# Patient Record
Sex: MALE | Race: WHITE | Employment: UNEMPLOYED | URBAN - NONMETROPOLITAN AREA
[De-identification: names, ages, dates, MRNs, and addresses within clinical notes are randomized per-mention and may not be internally consistent; named-entity substitution may affect disease eponyms.]

---

## 2020-05-22 ENCOUNTER — APPOINTMENT (OUTPATIENT)
Dept: CT IMAGING | Age: 70
End: 2020-05-22
Payer: MEDICARE

## 2020-05-22 ENCOUNTER — APPOINTMENT (OUTPATIENT)
Dept: GENERAL RADIOLOGY | Age: 70
End: 2020-05-22
Payer: MEDICARE

## 2020-05-22 ENCOUNTER — HOSPITAL ENCOUNTER (EMERGENCY)
Age: 70
Discharge: HOME | End: 2020-05-22
Payer: MEDICARE

## 2020-05-22 VITALS
TEMPERATURE: 97.7 F | HEART RATE: 72 BPM | WEIGHT: 222 LBS | DIASTOLIC BLOOD PRESSURE: 82 MMHG | HEIGHT: 74 IN | SYSTOLIC BLOOD PRESSURE: 143 MMHG | BODY MASS INDEX: 28.49 KG/M2 | OXYGEN SATURATION: 92 % | RESPIRATION RATE: 22 BRPM

## 2020-05-22 LAB
A/G RATIO: 1.2 (ref 1.1–2.2)
ALBUMIN SERPL-MCNC: 5 G/DL (ref 3.4–5)
ALP BLD-CCNC: 104 U/L (ref 40–129)
ALT SERPL-CCNC: 51 U/L (ref 10–40)
ANION GAP SERPL CALCULATED.3IONS-SCNC: 13 MMOL/L (ref 3–16)
AST SERPL-CCNC: 53 U/L (ref 15–37)
BASOPHILS ABSOLUTE: 0 K/UL (ref 0–0.2)
BASOPHILS RELATIVE PERCENT: 0.3 %
BILIRUB SERPL-MCNC: 0.5 MG/DL (ref 0–1)
BILIRUBIN URINE: NEGATIVE
BLOOD, URINE: ABNORMAL
BUN BLDV-MCNC: 17 MG/DL (ref 7–20)
CALCIUM SERPL-MCNC: 10.4 MG/DL (ref 8.3–10.6)
CHLORIDE BLD-SCNC: 104 MMOL/L (ref 99–110)
CLARITY: CLEAR
CO2: 24 MMOL/L (ref 21–32)
COLOR: YELLOW
CREAT SERPL-MCNC: 1.3 MG/DL (ref 0.8–1.3)
EOSINOPHILS ABSOLUTE: 0.1 K/UL (ref 0–0.6)
EOSINOPHILS RELATIVE PERCENT: 0.9 %
EPITHELIAL CELLS, UA: NORMAL /HPF (ref 0–5)
GFR AFRICAN AMERICAN: >60
GFR NON-AFRICAN AMERICAN: 54
GLOBULIN: 4.1 G/DL
GLUCOSE BLD-MCNC: 146 MG/DL (ref 70–99)
GLUCOSE URINE: >=1000 MG/DL
HCT VFR BLD CALC: 46.7 % (ref 40.5–52.5)
HEMOGLOBIN: 15.3 G/DL (ref 13.5–17.5)
KETONES, URINE: NEGATIVE MG/DL
LEUKOCYTE ESTERASE, URINE: NEGATIVE
LYMPHOCYTES ABSOLUTE: 1 K/UL (ref 1–5.1)
LYMPHOCYTES RELATIVE PERCENT: 8.9 %
MCH RBC QN AUTO: 31.5 PG (ref 26–34)
MCHC RBC AUTO-ENTMCNC: 32.8 G/DL (ref 31–36)
MCV RBC AUTO: 96.1 FL (ref 80–100)
MICROSCOPIC EXAMINATION: YES
MONOCYTES ABSOLUTE: 0.7 K/UL (ref 0–1.3)
MONOCYTES RELATIVE PERCENT: 6.2 %
NEUTROPHILS ABSOLUTE: 9.4 K/UL (ref 1.7–7.7)
NEUTROPHILS RELATIVE PERCENT: 83.7 %
NITRITE, URINE: NEGATIVE
PDW BLD-RTO: 13.5 % (ref 12.4–15.4)
PH UA: 6 (ref 5–8)
PLATELET # BLD: 188 K/UL (ref 135–450)
PMV BLD AUTO: 8.3 FL (ref 5–10.5)
POTASSIUM REFLEX MAGNESIUM: 4.8 MMOL/L (ref 3.5–5.1)
PROTEIN UA: ABNORMAL MG/DL
RBC # BLD: 4.86 M/UL (ref 4.2–5.9)
RBC UA: NORMAL /HPF (ref 0–4)
SODIUM BLD-SCNC: 141 MMOL/L (ref 136–145)
SPECIFIC GRAVITY UA: 1.01 (ref 1–1.03)
TOTAL PROTEIN: 9.1 G/DL (ref 6.4–8.2)
URINE REFLEX TO CULTURE: ABNORMAL
URINE TYPE: ABNORMAL
UROBILINOGEN, URINE: 0.2 E.U./DL
WBC # BLD: 11.3 K/UL (ref 4–11)
WBC UA: NORMAL /HPF (ref 0–5)

## 2020-05-22 PROCEDURE — 85025 COMPLETE CBC W/AUTO DIFF WBC: CPT

## 2020-05-22 PROCEDURE — 81001 URINALYSIS AUTO W/SCOPE: CPT

## 2020-05-22 PROCEDURE — 71046 X-RAY EXAM CHEST 2 VIEWS: CPT

## 2020-05-22 PROCEDURE — 74177 CT ABD & PELVIS W/CONTRAST: CPT

## 2020-05-22 PROCEDURE — 6360000002 HC RX W HCPCS: Performed by: PHYSICIAN ASSISTANT

## 2020-05-22 PROCEDURE — 72125 CT NECK SPINE W/O DYE: CPT

## 2020-05-22 PROCEDURE — 96375 TX/PRO/DX INJ NEW DRUG ADDON: CPT

## 2020-05-22 PROCEDURE — 73030 X-RAY EXAM OF SHOULDER: CPT

## 2020-05-22 PROCEDURE — 72131 CT LUMBAR SPINE W/O DYE: CPT

## 2020-05-22 PROCEDURE — 36415 COLL VENOUS BLD VENIPUNCTURE: CPT

## 2020-05-22 PROCEDURE — 96374 THER/PROPH/DIAG INJ IV PUSH: CPT

## 2020-05-22 PROCEDURE — 3209999900 CT THORACIC SPINE TRAUMA RECONSTRUCTION

## 2020-05-22 PROCEDURE — 99284 EMERGENCY DEPT VISIT MOD MDM: CPT

## 2020-05-22 PROCEDURE — 80053 COMPREHEN METABOLIC PANEL: CPT

## 2020-05-22 PROCEDURE — 90715 TDAP VACCINE 7 YRS/> IM: CPT | Performed by: PHYSICIAN ASSISTANT

## 2020-05-22 PROCEDURE — 90471 IMMUNIZATION ADMIN: CPT | Performed by: PHYSICIAN ASSISTANT

## 2020-05-22 PROCEDURE — 6360000004 HC RX CONTRAST MEDICATION: Performed by: PHYSICIAN ASSISTANT

## 2020-05-22 PROCEDURE — 70450 CT HEAD/BRAIN W/O DYE: CPT

## 2020-05-22 RX ORDER — MORPHINE SULFATE 4 MG/ML
4 INJECTION, SOLUTION INTRAMUSCULAR; INTRAVENOUS ONCE
Status: COMPLETED | OUTPATIENT
Start: 2020-05-22 | End: 2020-05-22

## 2020-05-22 RX ORDER — ONDANSETRON 2 MG/ML
4 INJECTION INTRAMUSCULAR; INTRAVENOUS EVERY 6 HOURS PRN
Status: DISCONTINUED | OUTPATIENT
Start: 2020-05-22 | End: 2020-05-22 | Stop reason: HOSPADM

## 2020-05-22 RX ORDER — HYDROCODONE BITARTRATE AND ACETAMINOPHEN 5; 325 MG/1; MG/1
1 TABLET ORAL EVERY 6 HOURS PRN
Qty: 10 TABLET | Refills: 0 | Status: SHIPPED | OUTPATIENT
Start: 2020-05-22 | End: 2020-05-25

## 2020-05-22 RX ADMIN — ONDANSETRON HYDROCHLORIDE 4 MG: 2 INJECTION, SOLUTION INTRAMUSCULAR; INTRAVENOUS at 16:02

## 2020-05-22 RX ADMIN — IOPAMIDOL 75 ML: 755 INJECTION, SOLUTION INTRAVENOUS at 16:23

## 2020-05-22 RX ADMIN — MORPHINE SULFATE 4 MG: 4 INJECTION, SOLUTION INTRAMUSCULAR; INTRAVENOUS at 16:03

## 2020-05-22 RX ADMIN — TETANUS TOXOID, REDUCED DIPHTHERIA TOXOID AND ACELLULAR PERTUSSIS VACCINE, ADSORBED 0.5 ML: 5; 2.5; 8; 8; 2.5 SUSPENSION INTRAMUSCULAR at 16:02

## 2020-05-22 ASSESSMENT — PAIN SCALES - GENERAL
PAINLEVEL_OUTOF10: 0
PAINLEVEL_OUTOF10: 10
PAINLEVEL_OUTOF10: 0
PAINLEVEL_OUTOF10: 10

## 2020-05-22 ASSESSMENT — PAIN DESCRIPTION - ORIENTATION: ORIENTATION: RIGHT

## 2020-05-22 ASSESSMENT — ENCOUNTER SYMPTOMS
BACK PAIN: 1
RESPIRATORY NEGATIVE: 1
GASTROINTESTINAL NEGATIVE: 1
EYES NEGATIVE: 1

## 2020-05-22 ASSESSMENT — PAIN DESCRIPTION - PAIN TYPE: TYPE: ACUTE PAIN

## 2020-05-22 ASSESSMENT — PAIN DESCRIPTION - LOCATION: LOCATION: SHOULDER

## 2020-05-22 ASSESSMENT — PAIN DESCRIPTION - ONSET: ONSET: OTHER (COMMENT)

## 2020-05-22 ASSESSMENT — PAIN DESCRIPTION - DESCRIPTORS: DESCRIPTORS: SHARP

## 2020-05-22 ASSESSMENT — PAIN DESCRIPTION - FREQUENCY: FREQUENCY: INTERMITTENT

## 2020-05-22 NOTE — ED PROVIDER NOTES
sluggish. Left eye: Pupil is round, reactive and not sluggish. Neck:      Musculoskeletal: Full passive range of motion without pain, normal range of motion and neck supple. Normal range of motion. Muscular tenderness present. No neck rigidity, crepitus, injury, pain with movement or spinous process tenderness. Cardiovascular:      Rate and Rhythm: Normal rate and regular rhythm. Pulses:           Radial pulses are 2+ on the right side and 2+ on the left side. Femoral pulses are 2+ on the right side and 2+ on the left side. Dorsalis pedis pulses are 2+ on the right side and 2+ on the left side. Posterior tibial pulses are 2+ on the right side and 2+ on the left side. Heart sounds: Normal heart sounds. No murmur. No gallop. Pulmonary:      Effort: Pulmonary effort is normal. No respiratory distress. Breath sounds: Normal breath sounds. No decreased breath sounds, wheezing, rhonchi or rales. Chest:      Chest wall: Tenderness present. No deformity, swelling or crepitus. Comments: Tenderness noted along the anterior rib cage. No ecchymosis noted. Abdominal:      General: Abdomen is flat. Bowel sounds are normal.      Palpations: Abdomen is soft. Tenderness: There is no abdominal tenderness. There is no right CVA tenderness, left CVA tenderness, guarding or rebound. Musculoskeletal:         General: No deformity. Right shoulder: He exhibits decreased range of motion, bony tenderness and pain. Right elbow: Normal.He exhibits normal range of motion, no swelling, no effusion, no deformity and no laceration. Right wrist: Normal. He exhibits normal range of motion, no tenderness, no bony tenderness, no swelling, no effusion, no crepitus, no deformity and no laceration. Cervical back: He exhibits tenderness.  He exhibits normal range of motion, no bony tenderness, no swelling, no edema, no deformity, no laceration, no pain, no spasm and noted below, none     Procedures    CRITICAL CARE TIME   N/A    CONSULTS:  None      EMERGENCY DEPARTMENT COURSE and DIFFERENTIAL DIAGNOSIS/MDM:   Vitals:    Vitals:    05/22/20 1715 05/22/20 1726 05/22/20 1832 05/22/20 1850   BP: (!) 155/78 (!) 155/78 (!) 143/82    Pulse: 72 75 72    Resp: (!) 31 30 22 22   Temp:       TempSrc:       SpO2: (!) 88% 92%     Weight:       Height:           Patient was given the following medications:  Medications   Tetanus-Diphth-Acell Pertussis (BOOSTRIX) injection 0.5 mL (0.5 mLs Intramuscular Given 5/22/20 1602)   morphine sulfate (PF) injection 4 mg (4 mg Intravenous Given 5/22/20 1603)   iopamidol (ISOVUE-370) 76 % injection 75 mL (75 mLs Intravenous Given 5/22/20 1623)       Patient brought in today for evaluation of a fall from 6 feet off of a ladder. On exam patient is alert oriented afebrile breathing on room air satting at 92%. Nontoxic in no acute respiratory distress. Old labs and records reviewed. Patient ambulated into the ER without difficulty. CBC reveals a slight leukocytosis of 11.3. Hemoglobin of 15.3. No acute electrolyte abnormalities. CT head reveals no acute intracranial abnormality. No acute fracture of the cervical spine. CT lumbar spine reveals an acute comminuted T12 vertebral body burst fracture. Will obtain CT thoracic for further evaluation. There is approximately 10% loss of height centrally without significant retropulsion. X-ray of the right shoulder reveals findings concerning for a right scapular fracture at the junction of the scapular body and glenoid. Chest x-ray reveals borderline cardiomegaly with probably vascular congestion. Changes of pulmonary fibrosis in mid and lower lungs. Will obtain CT chest, abdomen and pelvis at this time for further evaluation. Patient was given morphine and Zofran here. Urine shows moderate amount of blood.     CT thoracic spine trauma reconstructions reveals a compression wedge type fracture

## 2020-05-22 NOTE — ED NOTES
Pt requested to ambulate to CT. PA informed and approved.       Marquis Mcdermott, GAVINO  05/22/20 9680

## 2022-11-30 ENCOUNTER — APPOINTMENT (OUTPATIENT)
Dept: GENERAL RADIOLOGY | Age: 72
DRG: 871 | End: 2022-11-30
Payer: OTHER GOVERNMENT

## 2022-11-30 ENCOUNTER — APPOINTMENT (OUTPATIENT)
Dept: CT IMAGING | Age: 72
DRG: 871 | End: 2022-11-30
Payer: OTHER GOVERNMENT

## 2022-11-30 ENCOUNTER — HOSPITAL ENCOUNTER (INPATIENT)
Age: 72
LOS: 6 days | Discharge: HOME | DRG: 871 | End: 2022-12-06
Attending: STUDENT IN AN ORGANIZED HEALTH CARE EDUCATION/TRAINING PROGRAM | Admitting: HOSPITALIST
Payer: OTHER GOVERNMENT

## 2022-11-30 DIAGNOSIS — Z87.09 HISTORY OF PULMONARY FIBROSIS: ICD-10-CM

## 2022-11-30 DIAGNOSIS — J84.9 ILD (INTERSTITIAL LUNG DISEASE) (HCC): ICD-10-CM

## 2022-11-30 DIAGNOSIS — U07.1 PNEUMONIA DUE TO COVID-19 VIRUS: Primary | ICD-10-CM

## 2022-11-30 DIAGNOSIS — J96.21 ACUTE ON CHRONIC RESPIRATORY FAILURE WITH HYPOXIA (HCC): ICD-10-CM

## 2022-11-30 DIAGNOSIS — J12.82 PNEUMONIA DUE TO COVID-19 VIRUS: Primary | ICD-10-CM

## 2022-11-30 LAB
A/G RATIO: 0.8 (ref 1.1–2.2)
ALBUMIN SERPL-MCNC: 3.9 G/DL (ref 3.4–5)
ALP BLD-CCNC: 116 U/L (ref 40–129)
ALT SERPL-CCNC: 11 U/L (ref 10–40)
ANION GAP SERPL CALCULATED.3IONS-SCNC: 12 MMOL/L (ref 3–16)
AST SERPL-CCNC: 40 U/L (ref 15–37)
BASE EXCESS VENOUS: -5.5 MMOL/L (ref -3–3)
BASOPHILS ABSOLUTE: 0.1 K/UL (ref 0–0.2)
BASOPHILS RELATIVE PERCENT: 0.7 %
BILIRUB SERPL-MCNC: 0.7 MG/DL (ref 0–1)
BUN BLDV-MCNC: 21 MG/DL (ref 7–20)
CALCIUM SERPL-MCNC: 9.8 MG/DL (ref 8.3–10.6)
CARBOXYHEMOGLOBIN: 2.8 % (ref 0–1.5)
CHLORIDE BLD-SCNC: 99 MMOL/L (ref 99–110)
CO2: 23 MMOL/L (ref 21–32)
CREAT SERPL-MCNC: 1 MG/DL (ref 0.8–1.3)
D DIMER: 3.19 UG/ML FEU (ref 0–0.6)
EKG ATRIAL RATE: 71 BPM
EKG DIAGNOSIS: NORMAL
EKG P AXIS: 31 DEGREES
EKG P-R INTERVAL: 146 MS
EKG Q-T INTERVAL: 416 MS
EKG QRS DURATION: 82 MS
EKG QTC CALCULATION (BAZETT): 452 MS
EKG R AXIS: -2 DEGREES
EKG T AXIS: 38 DEGREES
EKG VENTRICULAR RATE: 71 BPM
EOSINOPHILS ABSOLUTE: 0.2 K/UL (ref 0–0.6)
EOSINOPHILS RELATIVE PERCENT: 1.9 %
GFR SERPL CREATININE-BSD FRML MDRD: >60 ML/MIN/{1.73_M2}
GLUCOSE BLD-MCNC: 174 MG/DL (ref 70–99)
HCO3 VENOUS: 18.3 MMOL/L (ref 23–29)
HCT VFR BLD CALC: 45.1 % (ref 40.5–52.5)
HEMOGLOBIN: 15 G/DL (ref 13.5–17.5)
INFLUENZA A: NOT DETECTED
INFLUENZA B: NOT DETECTED
LACTIC ACID, SEPSIS: 1.8 MMOL/L (ref 0.4–1.9)
LYMPHOCYTES ABSOLUTE: 1.3 K/UL (ref 1–5.1)
LYMPHOCYTES RELATIVE PERCENT: 10.4 %
MCH RBC QN AUTO: 31.4 PG (ref 26–34)
MCHC RBC AUTO-ENTMCNC: 33.2 G/DL (ref 31–36)
MCV RBC AUTO: 94.4 FL (ref 80–100)
METHEMOGLOBIN VENOUS: 0.3 %
MONOCYTES ABSOLUTE: 1.1 K/UL (ref 0–1.3)
MONOCYTES RELATIVE PERCENT: 8.8 %
NEUTROPHILS ABSOLUTE: 10.1 K/UL (ref 1.7–7.7)
NEUTROPHILS RELATIVE PERCENT: 78.2 %
O2 CONTENT, VEN: 14 VOL %
O2 SAT, VEN: 82 %
O2 THERAPY: ABNORMAL
PCO2, VEN: 30.8 MMHG (ref 40–50)
PDW BLD-RTO: 13.9 % (ref 12.4–15.4)
PH VENOUS: 7.39 (ref 7.35–7.45)
PLATELET # BLD: 174 K/UL (ref 135–450)
PLATELET SLIDE REVIEW: ADEQUATE
PMV BLD AUTO: 8.4 FL (ref 5–10.5)
PO2, VEN: 45.7 MMHG (ref 25–40)
POTASSIUM REFLEX MAGNESIUM: 5.2 MMOL/L (ref 3.5–5.1)
PRO-BNP: 355 PG/ML (ref 0–124)
PROCALCITONIN: 0.06 NG/ML (ref 0–0.15)
RBC # BLD: 4.78 M/UL (ref 4.2–5.9)
SARS-COV-2 RNA, RT PCR: DETECTED
SLIDE REVIEW: ABNORMAL
SODIUM BLD-SCNC: 134 MMOL/L (ref 136–145)
TCO2 CALC VENOUS: 19 MMOL/L
TOTAL PROTEIN: 8.7 G/DL (ref 6.4–8.2)
TROPONIN: <0.01 NG/ML
WBC # BLD: 12.9 K/UL (ref 4–11)

## 2022-11-30 PROCEDURE — 6360000004 HC RX CONTRAST MEDICATION: Performed by: STUDENT IN AN ORGANIZED HEALTH CARE EDUCATION/TRAINING PROGRAM

## 2022-11-30 PROCEDURE — 93010 ELECTROCARDIOGRAM REPORT: CPT | Performed by: INTERNAL MEDICINE

## 2022-11-30 PROCEDURE — 71260 CT THORAX DX C+: CPT | Performed by: STUDENT IN AN ORGANIZED HEALTH CARE EDUCATION/TRAINING PROGRAM

## 2022-11-30 PROCEDURE — 2060000000 HC ICU INTERMEDIATE R&B

## 2022-11-30 PROCEDURE — 2700000000 HC OXYGEN THERAPY PER DAY

## 2022-11-30 PROCEDURE — 87040 BLOOD CULTURE FOR BACTERIA: CPT

## 2022-11-30 PROCEDURE — 94761 N-INVAS EAR/PLS OXIMETRY MLT: CPT

## 2022-11-30 PROCEDURE — 6360000002 HC RX W HCPCS: Performed by: STUDENT IN AN ORGANIZED HEALTH CARE EDUCATION/TRAINING PROGRAM

## 2022-11-30 PROCEDURE — 36415 COLL VENOUS BLD VENIPUNCTURE: CPT

## 2022-11-30 PROCEDURE — 85379 FIBRIN DEGRADATION QUANT: CPT

## 2022-11-30 PROCEDURE — 84145 PROCALCITONIN (PCT): CPT

## 2022-11-30 PROCEDURE — 82803 BLOOD GASES ANY COMBINATION: CPT

## 2022-11-30 PROCEDURE — 85025 COMPLETE CBC W/AUTO DIFF WBC: CPT

## 2022-11-30 PROCEDURE — 71045 X-RAY EXAM CHEST 1 VIEW: CPT

## 2022-11-30 PROCEDURE — 83880 ASSAY OF NATRIURETIC PEPTIDE: CPT

## 2022-11-30 PROCEDURE — 80053 COMPREHEN METABOLIC PANEL: CPT

## 2022-11-30 PROCEDURE — 93005 ELECTROCARDIOGRAM TRACING: CPT | Performed by: STUDENT IN AN ORGANIZED HEALTH CARE EDUCATION/TRAINING PROGRAM

## 2022-11-30 PROCEDURE — 87636 SARSCOV2 & INF A&B AMP PRB: CPT

## 2022-11-30 PROCEDURE — 83605 ASSAY OF LACTIC ACID: CPT

## 2022-11-30 PROCEDURE — 84484 ASSAY OF TROPONIN QUANT: CPT

## 2022-11-30 PROCEDURE — 96374 THER/PROPH/DIAG INJ IV PUSH: CPT

## 2022-11-30 PROCEDURE — 99285 EMERGENCY DEPT VISIT HI MDM: CPT

## 2022-11-30 RX ORDER — ATORVASTATIN CALCIUM 40 MG/1
80 TABLET, FILM COATED ORAL DAILY
Status: DISCONTINUED | OUTPATIENT
Start: 2022-12-01 | End: 2022-12-06 | Stop reason: HOSPADM

## 2022-11-30 RX ORDER — POLYETHYLENE GLYCOL 3350 17 G/17G
17 POWDER, FOR SOLUTION ORAL DAILY PRN
Status: DISCONTINUED | OUTPATIENT
Start: 2022-11-30 | End: 2022-12-06 | Stop reason: HOSPADM

## 2022-11-30 RX ORDER — INSULIN LISPRO 100 [IU]/ML
0-4 INJECTION, SOLUTION INTRAVENOUS; SUBCUTANEOUS NIGHTLY
Status: DISCONTINUED | OUTPATIENT
Start: 2022-12-01 | End: 2022-12-06 | Stop reason: HOSPADM

## 2022-11-30 RX ORDER — SODIUM CHLORIDE 0.9 % (FLUSH) 0.9 %
5-40 SYRINGE (ML) INJECTION EVERY 12 HOURS SCHEDULED
Status: DISCONTINUED | OUTPATIENT
Start: 2022-11-30 | End: 2022-12-06 | Stop reason: HOSPADM

## 2022-11-30 RX ORDER — ISOSORBIDE MONONITRATE 60 MG/1
60 TABLET, EXTENDED RELEASE ORAL DAILY
Status: DISCONTINUED | OUTPATIENT
Start: 2022-12-01 | End: 2022-12-06 | Stop reason: HOSPADM

## 2022-11-30 RX ORDER — AMLODIPINE BESYLATE 5 MG/1
5 TABLET ORAL DAILY
Status: DISCONTINUED | OUTPATIENT
Start: 2022-12-01 | End: 2022-12-01

## 2022-11-30 RX ORDER — INSULIN LISPRO 100 [IU]/ML
0-4 INJECTION, SOLUTION INTRAVENOUS; SUBCUTANEOUS
Status: DISCONTINUED | OUTPATIENT
Start: 2022-12-01 | End: 2022-12-06 | Stop reason: HOSPADM

## 2022-11-30 RX ORDER — ASPIRIN 81 MG/1
81 TABLET ORAL DAILY
Status: DISCONTINUED | OUTPATIENT
Start: 2022-12-01 | End: 2022-12-06 | Stop reason: HOSPADM

## 2022-11-30 RX ORDER — ENOXAPARIN SODIUM 100 MG/ML
30 INJECTION SUBCUTANEOUS 2 TIMES DAILY
Status: DISCONTINUED | OUTPATIENT
Start: 2022-12-01 | End: 2022-12-06 | Stop reason: HOSPADM

## 2022-11-30 RX ORDER — ONDANSETRON 2 MG/ML
4 INJECTION INTRAMUSCULAR; INTRAVENOUS EVERY 6 HOURS PRN
Status: DISCONTINUED | OUTPATIENT
Start: 2022-11-30 | End: 2022-12-06 | Stop reason: HOSPADM

## 2022-11-30 RX ORDER — DEXAMETHASONE SODIUM PHOSPHATE 10 MG/ML
6 INJECTION, SOLUTION INTRAMUSCULAR; INTRAVENOUS ONCE
Status: COMPLETED | OUTPATIENT
Start: 2022-11-30 | End: 2022-11-30

## 2022-11-30 RX ORDER — ACETAMINOPHEN 325 MG/1
650 TABLET ORAL EVERY 6 HOURS PRN
Status: DISCONTINUED | OUTPATIENT
Start: 2022-11-30 | End: 2022-12-06 | Stop reason: HOSPADM

## 2022-11-30 RX ORDER — SODIUM CHLORIDE 9 MG/ML
INJECTION, SOLUTION INTRAVENOUS PRN
Status: DISCONTINUED | OUTPATIENT
Start: 2022-11-30 | End: 2022-12-06 | Stop reason: HOSPADM

## 2022-11-30 RX ORDER — ONDANSETRON 4 MG/1
4 TABLET, ORALLY DISINTEGRATING ORAL EVERY 8 HOURS PRN
Status: DISCONTINUED | OUTPATIENT
Start: 2022-11-30 | End: 2022-12-06 | Stop reason: HOSPADM

## 2022-11-30 RX ORDER — DEXTROSE MONOHYDRATE 100 MG/ML
INJECTION, SOLUTION INTRAVENOUS CONTINUOUS PRN
Status: DISCONTINUED | OUTPATIENT
Start: 2022-11-30 | End: 2022-12-06 | Stop reason: HOSPADM

## 2022-11-30 RX ORDER — SODIUM CHLORIDE 0.9 % (FLUSH) 0.9 %
5-40 SYRINGE (ML) INJECTION PRN
Status: DISCONTINUED | OUTPATIENT
Start: 2022-11-30 | End: 2022-12-06 | Stop reason: HOSPADM

## 2022-11-30 RX ORDER — LISINOPRIL 10 MG/1
10 TABLET ORAL DAILY
Status: DISCONTINUED | OUTPATIENT
Start: 2022-12-01 | End: 2022-12-01

## 2022-11-30 RX ORDER — ATENOLOL 50 MG/1
100 TABLET ORAL DAILY
Status: DISCONTINUED | OUTPATIENT
Start: 2022-12-01 | End: 2022-12-06 | Stop reason: HOSPADM

## 2022-11-30 RX ORDER — DEXAMETHASONE SODIUM PHOSPHATE 10 MG/ML
6 INJECTION, SOLUTION INTRAMUSCULAR; INTRAVENOUS EVERY 24 HOURS
Status: DISCONTINUED | OUTPATIENT
Start: 2022-12-01 | End: 2022-12-01

## 2022-11-30 RX ADMIN — IOPAMIDOL 85 ML: 755 INJECTION, SOLUTION INTRAVENOUS at 17:04

## 2022-11-30 RX ADMIN — DEXAMETHASONE SODIUM PHOSPHATE 6 MG: 10 INJECTION INTRAMUSCULAR; INTRAVENOUS at 18:39

## 2022-11-30 ASSESSMENT — PAIN - FUNCTIONAL ASSESSMENT: PAIN_FUNCTIONAL_ASSESSMENT: NONE - DENIES PAIN

## 2022-11-30 NOTE — ED PROVIDER NOTES
[address] [place] [address] ED      CHIEF COMPLAINT  Respiratory Distress (Patient comes in due to difficulty breathing. Patient 79% in triage on 4LNC)     HISTORY OF PRESENT ILLNESS  Monserrat Freeman is a 67 y.o. male  who presents to the ED complaining of shortness of breath, cough and hemoptysis. Patient states that symptoms started about 2 days ago. He states that his main concern is his shortness of breath. He is normally on 2 L of oxygen via nasal cannula for history of pulmonary fibrosis. Arrives today at 79% on 4 L nasal cannula by squad. He did receive breathing treatments prior to arrival with some transient improvement of his symptoms. He states that he has had some pleuritic chest pain in the center of his chest as well. He denies any leg pain or leg swelling that is new. He denies fevers. He denies any other complaints or concerns. No other complaints, modifying factors or associated symptoms. I have reviewed the following from the nursing documentation. Past Medical History:   Diagnosis Date    Anemia     BPH (benign prostatic hypertrophy)     CAD (coronary artery disease)     Diabetes mellitus ([facility] [address]     History of colonic polyps     Hyperlipidemia     Hypertension     Hypertrophy of prostate without urinary obstruction and other lower urinary tract symptoms (LUTS) 11/1/2011     Past Surgical History:   Procedure Laterality Date    CARDIAC CATHETERIZATION  3/2013    no stents    COLONOSCOPY       Family History   Problem Relation Age of Onset    Diabetes Mother     Heart Disease Mother     High Cholesterol Mother     Diabetes Father     Heart Disease Father     High Cholesterol Father     Diabetes Sister     Heart Disease Sister     High Cholesterol Sister     Diabetes Brother     Heart Disease Brother     High Cholesterol Brother      Social History     Socioeconomic History    Marital status:       Spouse name: Not on file    Number of children: Not on file    Years of education: Not on file    Highest education level: Not on file   Occupational History    Not on file   Tobacco Use    Smoking status: Former    Smokeless tobacco: Never   Substance and Sexual Activity    Alcohol use: No    Drug use: No    Sexual activity: Yes     Partners: Female   Other Topics Concern    Not on file   Social History Narrative    Not on file     Social Determinants of Health     Financial Resource Strain: Not on file   Food Insecurity: Not on file   Transportation Needs: Not on file   Physical Activity: Not on file   Stress: Not on file   Social Connections: Not on file   Intimate Partner Violence: Not on file   Housing Stability: Not on file     No current facility-administered medications for this encounter. Current Outpatient Medications   Medication Sig Dispense Refill    lisinopril (PRINIVIL;ZESTRIL) 10 MG tablet Take 10 mg by mouth daily. isosorbide mononitrate (IMDUR) 30 MG CR tablet Take 30 mg by mouth daily. aspirin 81 MG EC tablet Take 81 mg by mouth daily. metformin (GLUCOPHAGE) 1000 MG tablet Take 1,000 mg by mouth 2 times daily (with meals). glyBURIDE (DIABETA) 5 MG tablet Take 10 mg by mouth daily (with breakfast). atenolol (TENORMIN) 100 MG tablet Take 100 mg by mouth daily. amLODIPine (NORVASC) 5 MG tablet Take 1 tablet by mouth daily. 30 tablet 2    simvastatin (ZOCOR) 20 MG tablet Take 1 tablet by mouth nightly. 30 tablet 2     No Known Allergies    REVIEW OF SYSTEMS  10 systems reviewed, pertinent positives per HPI otherwise noted to be negative. PHYSICAL EXAM  /72   Pulse 68   Temp 98.8 °F (37.1 °C)   Resp 26   Ht 5' 9\" (1.753 m)   Wt 220 lb (99.8 kg)   SpO2 96%   BMI 32.49 kg/m²    GENERAL APPEARANCE: Awake and alert. Cooperative. Mild respiratory distress. HENT: Normocephalic. Atraumatic. Mucous membranes are moist.  NECK: Supple. EYES: PERRL. EOM's grossly intact. HEART/CHEST: RRR. No murmurs.     LUNGS: Respirations unlabored. Diminished breath sounds bilaterally throughout lung fields. Speaking comfortably in full sentences. ABDOMEN: No tenderness. Soft. Non-distended. No masses. No organomegaly. No guarding or rebound. MUSCULOSKELETAL: No extremity edema. Compartments soft. No deformity. No tenderness in the extremities. All extremities neurovascularly intact. SKIN: Warm and dry. No acute rashes. NEUROLOGICAL: Alert and oriented. CN's 2-12 intact. No gross facial drooping. Strength 5/5, sensation intact. PSYCHIATRIC: Normal mood and affect. LABS  I have reviewed all labs for this visit.    Results for orders placed or performed during the hospital encounter of 11/30/22   COVID-19 & Influenza Combo    Specimen: Nasopharyngeal Swab   Result Value Ref Range    SARS-CoV-2 RNA, RT PCR DETECTED (A) NOT DETECTED    INFLUENZA A NOT DETECTED NOT DETECTED    INFLUENZA B NOT DETECTED NOT DETECTED   CBC with Auto Differential   Result Value Ref Range    WBC 12.9 (H) 4.0 - 11.0 K/uL    RBC 4.78 4.20 - 5.90 M/uL    Hemoglobin 15.0 13.5 - 17.5 g/dL    Hematocrit 45.1 40.5 - 52.5 %    MCV 94.4 80.0 - 100.0 fL    MCH 31.4 26.0 - 34.0 pg    MCHC 33.2 31.0 - 36.0 g/dL    RDW 13.9 12.4 - 15.4 %    Platelets 120 643 - 127 K/uL    MPV 8.4 5.0 - 10.5 fL    PLATELET SLIDE REVIEW Adequate     SLIDE REVIEW see below     Neutrophils % 78.2 %    Lymphocytes % 10.4 %    Monocytes % 8.8 %    Eosinophils % 1.9 %    Basophils % 0.7 %    Neutrophils Absolute 10.1 (H) 1.7 - 7.7 K/uL    Lymphocytes Absolute 1.3 1.0 - 5.1 K/uL    Monocytes Absolute 1.1 0.0 - 1.3 K/uL    Eosinophils Absolute 0.2 0.0 - 0.6 K/uL    Basophils Absolute 0.1 0.0 - 0.2 K/uL   Comprehensive Metabolic Panel w/ Reflex to MG   Result Value Ref Range    Sodium 134 (L) 136 - 145 mmol/L    Potassium reflex Magnesium 5.2 (H) 3.5 - 5.1 mmol/L    Chloride 99 99 - 110 mmol/L    CO2 23 21 - 32 mmol/L    Anion Gap 12 3 - 16    Glucose 174 (H) 70 - 99 mg/dL    BUN 21 (H) 7 - 20 mg/dL Creatinine 1.0 0.8 - 1.3 mg/dL    Est, Glom Filt Rate >60 >60    Calcium 9.8 8.3 - 10.6 mg/dL    Total Protein 8.7 (H) 6.4 - 8.2 g/dL    Albumin 3.9 3.4 - 5.0 g/dL    Albumin/Globulin Ratio 0.8 (L) 1.1 - 2.2    Total Bilirubin 0.7 0.0 - 1.0 mg/dL    Alkaline Phosphatase 116 40 - 129 U/L    ALT 11 10 - 40 U/L    AST 40 (H) 15 - 37 U/L   Troponin   Result Value Ref Range    Troponin <0.01 <0.01 ng/mL   Blood gas, venous   Result Value Ref Range    pH, Albaro 7.391 7.350 - 7.450    pCO2, Albaro 30.8 (L) 40.0 - 50.0 mmHg    pO2, Albaro 45.7 (H) 25.0 - 40.0 mmHg    HCO3, Venous 18.3 (L) 23.0 - 29.0 mmol/L    Base Excess, Albaro -5.5 (L) -3.0 - 3.0 mmol/L    O2 Sat, Albaro 82 Not Established %    Carboxyhemoglobin 2.8 (H) 0.0 - 1.5 %    MetHgb, Albaro 0.3 <1.5 %    TC02 (Calc), Albaro 19 Not Established mmol/L    O2 Content, Albaro 14 Not Established VOL %    O2 Therapy Unknown    D-Dimer, Quantitative   Result Value Ref Range    D-Dimer, Quant 3.19 (H) 0.00 - 0.60 ug/mL FEU   Brain Natriuretic Peptide   Result Value Ref Range    Pro- (H) 0 - 124 pg/mL   Lactate, Sepsis   Result Value Ref Range    Lactic Acid, Sepsis 1.8 0.4 - 1.9 mmol/L   Procalcitonin   Result Value Ref Range    Procalcitonin 0.06 0.00 - 0.15 ng/mL   EKG 12 Lead   Result Value Ref Range    Ventricular Rate 71 BPM    Atrial Rate 71 BPM    P-R Interval 146 ms    QRS Duration 82 ms    Q-T Interval 416 ms    QTc Calculation (Bazett) 452 ms    P Axis 31 degrees    R Axis -2 degrees    T Axis 38 degrees    Diagnosis       Normal sinus rhythmNormal ECGWhen compared with ECG of 08-APR-2013 05:25,No significant change was foundConfirmed by NINOSKA MADDEN, [address] ([id number]) on 11/30/2022 8:12:29 PM       ECG  The Ekg interpreted by me shows  normal sinus rhythm with a rate of 71  Axis is   Normal  QTc is  within an acceptable range  Intervals and Durations are unremarkable.       ST Segments: normal  No significant change from prior EKG dated 4/8/2013    RADIOLOGY  CT CHEST PULMONARY EMBOLISM W CONTRAST   Final Result   Images are significantly degraded by respiratory and cardiac motion. No evidence of pulmonary embolism. Extensive amount of ground-glass densities throughout both lungs near   completely opacifying lung parenchyma bilaterally concerning for multifocal   pneumonia. Fibrotic changes throughout both lungs, predominantly affecting the lung   periphery. Findings were present on prior CT examination. Mild cardiomegaly. RECOMMENDATIONS:   Unavailable         XR CHEST PORTABLE   Final Result   1. Findings likely reflect acute pneumonia bilateral mid and lower lungs on a   background of chronic pulmonary disease. 2. Calcific atherosclerosis aorta. 3. Cardiomegaly. ED COURSE/MDM  Patient seen and evaluated. Old records reviewed. Labs and imaging reviewed and results discussed with patient. Patient is a 39-year-old male, presenting with concerns for shortness of breath, cough. Also with some pleuritic chest pain. Full HPI as detailed above. Upon arrival in the ED, patient is 79% on 4 L nasal cannula, is normally on 2 L at baseline. He was placed on a nonrebreather with improvement of his oxygen status. Labs are performed and are overall reassuring. Chest x-ray does show evidence of pneumonia on a background of chronic pulmonary disease. Did test positive for COVID. His procalcitonin is negative, lactate is negative as well. He has no significant leukocytosis. D-dimer was performed and was elevated so he was sent for CT with PE protocol which showed multifocal pneumonia and fibrotic changes but no evidence of pulmonary embolism. Patient was eventually placed on high flow nasal cannula given episodes of desaturation here in the department. He will be hospitalized for further work-up and treatment of his condition, to treat him with Decadron for his COVID-pneumonia. He is comfortable in agreement with plan of care.     The total Critical Care time is 45 minutes which excludes separately billable procedures. I, Dr. Sluly Coles MD, am the primary clinician of record. Is this patient to be included in the SEP-1 Core Measure? No   Exclusion criteria - the patient is NOT to be included for SEP-1 Core Measure due to:  Viral etiology found or highly suspected (including COVID-19) without concomitant bacterial infection     During the patient's ED course, the patient was given:  Medications   iopamidol (ISOVUE-370) 76 % injection 85 mL (85 mLs IntraVENous Given 11/30/22 1704)   dexamethasone (PF) (DECADRON) injection 6 mg (6 mg IntraVENous Given 11/30/22 1839)        CLINICAL IMPRESSION  1. Pneumonia due to COVID-19 virus    2. History of pulmonary fibrosis    3. Acute on chronic respiratory failure with hypoxia (HCC)        Blood pressure 118/72, pulse 68, temperature 98.8 °F (37.1 °C), resp. rate 26, height 5' 9\" (1.753 m), weight 220 lb (99.8 kg), SpO2 96 %. Estefania Brady was admitted in fair condition. Patient was given scripts for the following medications. I counseled patient how to take these medications. New Prescriptions    No medications on file       Follow-up with:  No follow-up provider specified. DISCLAIMER: This chart was created using Dragon dictation software. Efforts were made by me to ensure accuracy, however some errors may be present due to limitations of this technology and occasionally words are not transcribed correctly.        Sully Coles MD  11/30/22 6252

## 2022-12-01 PROBLEM — Z87.09 HISTORY OF PULMONARY FIBROSIS: Status: ACTIVE | Noted: 2022-12-01

## 2022-12-01 PROBLEM — J96.21 ACUTE ON CHRONIC RESPIRATORY FAILURE WITH HYPOXIA (HCC): Status: ACTIVE | Noted: 2022-12-01

## 2022-12-01 LAB
ANION GAP SERPL CALCULATED.3IONS-SCNC: 14 MMOL/L (ref 3–16)
BASE EXCESS VENOUS: -6.2 MMOL/L (ref -3–3)
BASOPHILS ABSOLUTE: 0 K/UL (ref 0–0.2)
BASOPHILS RELATIVE PERCENT: 0.2 %
BUN BLDV-MCNC: 26 MG/DL (ref 7–20)
C-REACTIVE PROTEIN: 136 MG/L (ref 0–5.1)
CALCIUM SERPL-MCNC: 9.3 MG/DL (ref 8.3–10.6)
CARBOXYHEMOGLOBIN: 2.7 % (ref 0–1.5)
CHLORIDE BLD-SCNC: 101 MMOL/L (ref 99–110)
CO2: 22 MMOL/L (ref 21–32)
CREAT SERPL-MCNC: 1 MG/DL (ref 0.8–1.3)
EOSINOPHILS ABSOLUTE: 0 K/UL (ref 0–0.6)
EOSINOPHILS RELATIVE PERCENT: 0.1 %
GFR SERPL CREATININE-BSD FRML MDRD: >60 ML/MIN/{1.73_M2}
GLUCOSE BLD-MCNC: 133 MG/DL (ref 70–99)
GLUCOSE BLD-MCNC: 138 MG/DL (ref 70–99)
GLUCOSE BLD-MCNC: 251 MG/DL (ref 70–99)
HCO3 VENOUS: 18.1 MMOL/L (ref 23–29)
HCT VFR BLD CALC: 42.7 % (ref 40.5–52.5)
HEMOGLOBIN: 14.6 G/DL (ref 13.5–17.5)
LACTIC ACID: 1.2 MMOL/L (ref 0.4–2)
LYMPHOCYTES ABSOLUTE: 1.2 K/UL (ref 1–5.1)
LYMPHOCYTES RELATIVE PERCENT: 15 %
MCH RBC QN AUTO: 32.1 PG (ref 26–34)
MCHC RBC AUTO-ENTMCNC: 34.2 G/DL (ref 31–36)
MCV RBC AUTO: 94 FL (ref 80–100)
METHEMOGLOBIN VENOUS: 0.3 %
MONOCYTES ABSOLUTE: 0.7 K/UL (ref 0–1.3)
MONOCYTES RELATIVE PERCENT: 8.2 %
NEUTROPHILS ABSOLUTE: 6.3 K/UL (ref 1.7–7.7)
NEUTROPHILS RELATIVE PERCENT: 76.5 %
O2 CONTENT, VEN: 20 VOL %
O2 SAT, VEN: 97 %
O2 THERAPY: ABNORMAL
PCO2, VEN: 32.7 MMHG (ref 40–50)
PDW BLD-RTO: 13.9 % (ref 12.4–15.4)
PERFORMED ON: ABNORMAL
PERFORMED ON: ABNORMAL
PH VENOUS: 7.36 (ref 7.35–7.45)
PLATELET # BLD: 192 K/UL (ref 135–450)
PMV BLD AUTO: 8 FL (ref 5–10.5)
PO2, VEN: 87.6 MMHG (ref 25–40)
POTASSIUM REFLEX MAGNESIUM: 4.4 MMOL/L (ref 3.5–5.1)
PROCALCITONIN: 0.08 NG/ML (ref 0–0.15)
RBC # BLD: 4.55 M/UL (ref 4.2–5.9)
SODIUM BLD-SCNC: 137 MMOL/L (ref 136–145)
TCO2 CALC VENOUS: 19 MMOL/L
WBC # BLD: 8.2 K/UL (ref 4–11)

## 2022-12-01 PROCEDURE — 6370000000 HC RX 637 (ALT 250 FOR IP): Performed by: HOSPITALIST

## 2022-12-01 PROCEDURE — 2580000003 HC RX 258: Performed by: INTERNAL MEDICINE

## 2022-12-01 PROCEDURE — 84145 PROCALCITONIN (PCT): CPT

## 2022-12-01 PROCEDURE — 6360000002 HC RX W HCPCS: Performed by: HOSPITALIST

## 2022-12-01 PROCEDURE — 2580000003 HC RX 258: Performed by: HOSPITALIST

## 2022-12-01 PROCEDURE — 82803 BLOOD GASES ANY COMBINATION: CPT

## 2022-12-01 PROCEDURE — 86140 C-REACTIVE PROTEIN: CPT

## 2022-12-01 PROCEDURE — 6360000002 HC RX W HCPCS: Performed by: INTERNAL MEDICINE

## 2022-12-01 PROCEDURE — 85025 COMPLETE CBC W/AUTO DIFF WBC: CPT

## 2022-12-01 PROCEDURE — 99255 IP/OBS CONSLTJ NEW/EST HI 80: CPT | Performed by: INTERNAL MEDICINE

## 2022-12-01 PROCEDURE — 80048 BASIC METABOLIC PNL TOTAL CA: CPT

## 2022-12-01 PROCEDURE — 2060000000 HC ICU INTERMEDIATE R&B

## 2022-12-01 PROCEDURE — 83605 ASSAY OF LACTIC ACID: CPT

## 2022-12-01 PROCEDURE — 83036 HEMOGLOBIN GLYCOSYLATED A1C: CPT

## 2022-12-01 PROCEDURE — 36415 COLL VENOUS BLD VENIPUNCTURE: CPT

## 2022-12-01 PROCEDURE — 99233 SBSQ HOSP IP/OBS HIGH 50: CPT

## 2022-12-01 PROCEDURE — 87449 NOS EACH ORGANISM AG IA: CPT

## 2022-12-01 RX ORDER — HYDROCODONE BITARTRATE AND ACETAMINOPHEN 5; 325 MG/1; MG/1
2 TABLET ORAL EVERY 4 HOURS PRN
Status: DISCONTINUED | OUTPATIENT
Start: 2022-12-01 | End: 2022-12-06 | Stop reason: HOSPADM

## 2022-12-01 RX ORDER — METHYLPREDNISOLONE SODIUM SUCCINATE 40 MG/ML
40 INJECTION, POWDER, LYOPHILIZED, FOR SOLUTION INTRAMUSCULAR; INTRAVENOUS EVERY 6 HOURS
Status: DISCONTINUED | OUTPATIENT
Start: 2022-12-01 | End: 2022-12-03

## 2022-12-01 RX ORDER — HYDROCODONE BITARTRATE AND ACETAMINOPHEN 5; 325 MG/1; MG/1
1 TABLET ORAL EVERY 4 HOURS PRN
Status: DISCONTINUED | OUTPATIENT
Start: 2022-12-01 | End: 2022-12-06 | Stop reason: HOSPADM

## 2022-12-01 RX ORDER — ATORVASTATIN CALCIUM 80 MG/1
80 TABLET, FILM COATED ORAL DAILY
COMMUNITY

## 2022-12-01 RX ADMIN — CEFTRIAXONE SODIUM 1000 MG: 1 INJECTION, POWDER, FOR SOLUTION INTRAMUSCULAR; INTRAVENOUS at 14:37

## 2022-12-01 RX ADMIN — ATORVASTATIN CALCIUM 80 MG: 40 TABLET, FILM COATED ORAL at 10:32

## 2022-12-01 RX ADMIN — DEXAMETHASONE SODIUM PHOSPHATE 6 MG: 10 INJECTION INTRAMUSCULAR; INTRAVENOUS at 10:32

## 2022-12-01 RX ADMIN — ISOSORBIDE MONONITRATE 60 MG: 60 TABLET ORAL at 10:32

## 2022-12-01 RX ADMIN — METHYLPREDNISOLONE SODIUM SUCCINATE 40 MG: 40 INJECTION, POWDER, FOR SOLUTION INTRAMUSCULAR; INTRAVENOUS at 15:43

## 2022-12-01 RX ADMIN — ENOXAPARIN SODIUM 30 MG: 100 INJECTION SUBCUTANEOUS at 20:59

## 2022-12-01 RX ADMIN — ENOXAPARIN SODIUM 30 MG: 100 INJECTION SUBCUTANEOUS at 10:32

## 2022-12-01 RX ADMIN — SODIUM CHLORIDE, PRESERVATIVE FREE 10 ML: 5 INJECTION INTRAVENOUS at 21:00

## 2022-12-01 RX ADMIN — METHYLPREDNISOLONE SODIUM SUCCINATE 40 MG: 40 INJECTION, POWDER, FOR SOLUTION INTRAMUSCULAR; INTRAVENOUS at 20:59

## 2022-12-01 RX ADMIN — TOCILIZUMAB 800 MG: 20 INJECTION, SOLUTION, CONCENTRATE INTRAVENOUS at 09:43

## 2022-12-01 RX ADMIN — ASPIRIN 81 MG: 81 TABLET, COATED ORAL at 10:32

## 2022-12-01 RX ADMIN — ATENOLOL 100 MG: 50 TABLET ORAL at 10:32

## 2022-12-01 ASSESSMENT — LIFESTYLE VARIABLES
HOW OFTEN DO YOU HAVE A DRINK CONTAINING ALCOHOL: NEVER
HOW MANY STANDARD DRINKS CONTAINING ALCOHOL DO YOU HAVE ON A TYPICAL DAY: PATIENT DOES NOT DRINK

## 2022-12-01 NOTE — CONSULTS
[facility]                                 Pulmonary Consult /Progress Note :                                                                  CHIEF COMPLAINT: SOB     Consulting provider:IM team    COVID hypoxic, pulmonary fibrosis  HPI:     67 y.o. male with history 30 PPY smoking history  of pulmonary fibrosis chronically on 2 lpm NC O2,   He presented to ED with worsening SOB and GORDON  He notes increased sob / gordon x 2 days. He's had a non productive cough, loss of appetite due to change in taste x 3-4 days and an episode of nausea and vomiting today while in the shower. He denies worsening sob after vomiting, denies hematemesis, fever, chills, chest pain, but due to progressive hypoxemia he consults ER today where he is currently seen on 10lpm high flow in no resp distress, speaking in full sentences coherently      He was tested + for COVID        Past Medical History:   Diagnosis Date    Anemia     BPH (benign prostatic hypertrophy)     CAD (coronary artery disease)     Diabetes mellitus ([facility] [address]     History of colonic polyps     Hyperlipidemia     Hypertension     Hypertrophy of prostate without urinary obstruction and other lower urinary tract symptoms (LUTS) 11/1/2011       Past Surgical History:        Procedure Laterality Date    CARDIAC CATHETERIZATION  3/2013    no stents    COLONOSCOPY         Allergies:  has No Known Allergies. Social History:    TOBACCO:   reports that he has quit smoking. He has never used smokeless tobacco.  ETOH:   reports no history of alcohol use.       Family History:       Problem Relation Age of Onset    Diabetes Mother     Heart Disease Mother     High Cholesterol Mother     Diabetes Father     Heart Disease Father     High Cholesterol Father     Diabetes Sister     Heart Disease Sister     High Cholesterol Sister     Diabetes Brother     Heart Disease Brother     High Cholesterol Brother        Current Medications:    Current Facility-Administered Medications:     dexamethasone (PF) (DECADRON) injection 6 mg, 6 mg, IntraVENous, Q24H, Misty Peterson MD, 6 mg at 12/01/22 1032    aspirin EC tablet 81 mg, 81 mg, Oral, Daily, Misty Peterson MD, 81 mg at 12/01/22 1032    atenolol (TENORMIN) tablet 100 mg, 100 mg, Oral, Daily, Misty Peterson MD, 100 mg at 12/01/22 1032    isosorbide mononitrate (IMDUR) extended release tablet 60 mg, 60 mg, Oral, Daily, Misty Peterson MD, 60 mg at 12/01/22 1032    atorvastatin (LIPITOR) tablet 80 mg, 80 mg, Oral, Daily, Misty Peterson MD, 80 mg at 12/01/22 1032    sodium chloride flush 0.9 % injection 5-40 mL, 5-40 mL, IntraVENous, 2 times per day, Misty Peterson MD    sodium chloride flush 0.9 % injection 5-40 mL, 5-40 mL, IntraVENous, PRN, Misty Peterson MD    0.9 % sodium chloride infusion, , IntraVENous, PRN, Misty Peterson MD    enoxaparin Sodium (LOVENOX) injection 30 mg, 30 mg, SubCUTAneous, BID, Misty Peterson MD, 30 mg at 12/01/22 1032    ondansetron (ZOFRAN-ODT) disintegrating tablet 4 mg, 4 mg, Oral, Q8H PRN **OR** ondansetron (ZOFRAN) injection 4 mg, 4 mg, IntraVENous, Q6H PRN, Misty Peterson MD    polyethylene glycol (GLYCOLAX) packet 17 g, 17 g, Oral, Daily PRN, Misty Peterson MD    acetaminophen (TYLENOL) tablet 650 mg, 650 mg, Oral, Q6H PRN **OR** acetaminophen (TYLENOL) suppository 650 mg, 650 mg, Rectal, Q6H PRN, Misty Peterson MD    glucose chewable tablet 16 g, 4 tablet, Oral, PRN, Misty Peterson MD    dextrose bolus 10% 125 mL, 125 mL, IntraVENous, PRN **OR** dextrose bolus 10% 250 mL, 250 mL, IntraVENous, PRN, Misty Peterson MD    glucagon (rDNA) injection 1 mg, 1 mg, SubCUTAneous, PRNMisty MD    dextrose 10 % infusion, , IntraVENous, Continuous PRMisty SOARES MD    insulin lispro (HUMALOG) injection vial 0-4 Units, 0-4 Units, SubCUTAneous, TID WCMisty MD    insulin lispro (HUMALOG) injection vial 0-4 Units, 0-4 Units, SubCUTAneous, Nightly, Porsha Grossman MD      REVIEW OF SYSTEMS:  Constitutional: Negative for fever  HENT: Negative for sore throat  Eyes: Negative for redness   Respiratory: SOB ,r dyspnea, cough  Cardiovascular: Negative for chest pain  Gastrointestinal: Negative for vomiting, diarrhea   Genitourinary: Negative for hematuria   Musculoskeletal: Negative for arthralgias   Skin: Negative for rash  Neurological: Negative for syncope  Hematological: Negative for adenopathy  Psychiatric/Behavorial: Negative for anxiety      Objective:   PHYSICAL EXAM:    Blood pressure 129/81, pulse 78, temperature 98.6 °F (37 °C), temperature source Oral, resp. rate 20, height 5' 9\" (1.753 m), weight 178 lb (80.7 kg), SpO2 93 %.' on RA  Gen: No distress. Eyes: PERRL. No sclera icterus. No conjunctival injection. ENT: No discharge. Pharynx clear. Neck: Trachea midline. No obvious mass. Resp: rales and rhonchi bilateral decrease breath sound   CV: Regular rate. Regular rhythm. No murmur or rub. No edema. GI: Non-tender. Non-distended. No hernia. Skin: Warm and dry. No nodule on exposed extremities. Lymph: No cervical LAD. No supraclavicular LAD. M/S: No cyanosis. No joint deformity. No clubbing. Neuro: Awake. Alert. Moves all four extremities. Psych: Oriented x 3. No anxiety.            DATA reviewed by me:   CXR  CT                 LABS/IMAGING:    CBC:  Lab Results   Component Value Date    WBC 8.2 12/01/2022    HGB 14.6 12/01/2022    HCT 42.7 12/01/2022    MCV 94.0 12/01/2022     12/01/2022    LYMPHOPCT 15.0 12/01/2022    RBC 4.55 12/01/2022    MCH 32.1 12/01/2022    MCHC 34.2 12/01/2022    RDW 13.9 12/01/2022    NEUTOPHILPCT 76.5 12/01/2022    MONOPCT 8.2 12/01/2022    EOSPCT 2.4 11/01/2011    BASOPCT 0.2 12/01/2022    NEUTROABS 6.3 12/01/2022    LYMPHSABS 1.2 12/01/2022    MONOSABS 0.7 12/01/2022    EOSABS 0.0 12/01/2022    BASOSABS 0.0 12/01/2022       Recent Labs     12/01/22  0524 11/30/22  1623   WBC 8. 2 12.9*   HGB 14.6 15.0   HCT 42.7 45.1   MCV 94.0 94.4    174       BMP:   Recent Labs     11/30/22  1623 12/01/22  0524   * 137   K 5.2* 4.4   CL 99 101   CO2 23 22   BUN 21* 26*   CREATININE 1.0 1.0       MG: No results found for: MG  Ca/Phos:   Lab Results   Component Value Date    CALCIUM 9.3 12/01/2022    PHOS 3.5 11/01/2011     LIVER PROFILE:   Recent Labs     11/30/22  1623   AST 40*   ALT 11   BILITOT 0.7   ALKPHOS 116       PT/INR: No results for input(s): PROTIME, INR in the last 72 hours. APTT: No results for input(s): APTT in the last 72 hours. Cardiac Enzymes:  Lab Results   Component Value Date    TROPONINI <0.01 11/30/2022       Assessment:       -Acute hypoxic respiratory failure   -COVID pneumonia   -Acute COPD exacerbation   -ILD ,seems [facility] will try to get records   -Possible CHF       Plan:      *-will treat with high dose steroids ,hard to tell if this just COVID or flare of his ILD   *-solumedrol 40 mg IV q 6   *-consider bronch if not better,r/o oppurtunistic infection  *- SOB may get worse ,transfer ICU   *_ already given actemra by primary   Check hepatitis panel   *- need records about his ILD and if he had diagnosis and what treatment he had   *-cover with Iv abx   *_ for up for infections   BD  Pan culture  Will monitor  bIPAP if any idstress  DVT px       Thank you very much for allowing me to participate in the care of this pleasant patient , should you have any questions ,please do not hesitate to contact me      Nani Colvin MD,[facility]  Pulmonary&Critical Care Medicine    Vishal Perea    NOTE: This report was transcribed using voice recognition software. Every effort was made to ensure accuracy; however, inadvertent computerized transcription errors may be present.

## 2022-12-01 NOTE — CONSULTS
Pharmacy to assist with Med Rec. Atenolol 100 mg daily. Atorvastatin 80 mg daily. Isosorbide mononitrate 60 mg daily. Above medications [facility].     Amlodipine and Lisinopril were discontinued - patient no longer taking them  Lucas Lan R.Ph.12/1/202211:26 AM

## 2022-12-01 NOTE — PROGRESS NOTES
12/01/22 1332   Encounter Summary   Encounter Overview/Reason  Advance Care Planning;Spiritual/Emotional Needs   Service Provided For: Patient   Referral/Consult From: Nurse   Support System Significant other;Children   Last Encounter    (12/1: pt concerned that significant other would be able to visit in hospital, pt currently under covid precautions, stated he wanted to keep decision making heiarchy as is and declined completing paperwork.  Visit ended when staff needed to enter room)   Complexity of Encounter Moderate   End Time  1332   Spiritual/Emotional needs   Type Spiritual Support   Advance Care Planning   Type ACP conversation

## 2022-12-01 NOTE — ED NOTES
[id number] Perfectserve sent to Dr Williams France  11/30/22 1908 2039- Dr Magnus Toure returned call and spoke with Dr Mildred Hicks  11/30/22 2039

## 2022-12-01 NOTE — ED NOTES
Pt moved from room 2 to room 3 to hospital bed. Pt had increased sob with movement to bed. Pulse ox reading 82% at this time.  Pt placed back on 15L KASI Torres RN  12/01/22 2753

## 2022-12-01 NOTE — FLOWSHEET NOTE
Patient introduced to room 306, vitals taken w/standing weight.       12/01/22 1145   Vital Signs   Temp 98.6 °F (37 °C)   Temp Source Oral   Heart Rate 78   Heart Rate Source Monitor   Resp 20   /81   MAP (Calculated) 97   BP Location Right upper arm   BP Method Automatic   Patient Position Sitting   Level of Consciousness 0   MEWS Score 1   Pain Assessment   Pain Assessment None - Denies Pain   Oxygen Therapy   SpO2 93 %   Pulse Oximetry Type Continuous   Pulse Oximeter Device Location Finger   O2 Device High flow nasal cannula   O2 Flow Rate (L/min) 10 L/min

## 2022-12-01 NOTE — PROGRESS NOTES
Med list reviewed with Pharmacist at [address] [facility]. Amlodipine and lisinopril pt no longer takes. Pt does take 80 mg of lipitor, 60 mg of Imdur and 5 mg of finastride.

## 2022-12-01 NOTE — CARE COORDINATION
Case Management Assessment  Initial Evaluation      Patient Name: Peyton Oconnell  YOB: 1950  Diagnosis: History of pulmonary fibrosis [Z87.09]  Acute on chronic respiratory failure with hypoxia ([facility] [J96.21]  Pneumonia due to COVID-19 virus [U07.1, J12.82]  Date / Time: 11/30/2022  4:08 PM    Admission status/Date:11/30/2022 Inpatient   Chart Reviewed: Yes      Patient Interviewed: Yes   Family Interviewed:  No      Hospitalization in the last 30 days:  No    Health Care Decision Maker :   Primary Decision Maker: Zach Beaver - Child - [phone]    (CM - must 1st enter selection under Navigator - emergency contact- Duy 8 Relationship and pick relationship)   Who do you trust or have selected to make healthcare decisions for you    Met with: pt  Interview conducted  (bedside/phone): bedside phone due to covid    Current PCP: through the [address] [place] [address] Road and [address] [address] required for SNF : Y          3 night stay required - N    ADLS  Support Systems/Care Needs:    Transportation:  [place]     Meal Preparation: [facility]: pt lives at home with Xena  Steps: 0  Intent for return to present living arrangements: Yes  Identified Issues: [address] [facility] with [address] : No Agency:(Services)     Passport/Waiver : No  :                      Phone Number:    Passport/Waiver Services: n/a          Durable Medical Equiptment   DME Provider: through the [facility]; he was unsure of the provider besides the [facility]  Equipment:   Walker___Cane___RTS___ BSC___Shower Chair___Hospital Bed___W/C____Other________  02 at _2___Liter(s)---wears(frequency)___cont____ HHN ___ CPAP___ BiPap___   N/A____    Home O2 Use :  Yes    If No for home O2---if presently on O2 during hospitalization:  Yes  if yes CM to follow for potential DC O2 need  Informed of need for care provider to bring portable home O2 tank on day of discharge for nursing to connect prior to leaving:   Yes  Verbalized agreement/Understanding:   Yes    Community Service Affiliation  Dialysis:  No    Agency:  Location:  Dialysis Schedule:  Phone:   Fax: Other Community Services: he stated they have housecleaning    DISCHARGE PLAN: Explained Case Management role/services. Chart review completed. Spoke with pt via bedside phone. He stated that he is independent at home with his ADL's and will return home when discharged. CM will follow. Please notify CM if needs or concerns arise.      Daysi Squires MSW, DINORAH-S

## 2022-12-01 NOTE — ACP (ADVANCE CARE PLANNING)
Advance Care Planning     Advance Care Planning Inpatient Note  [facility] Department    Today's Date: 12/1/2022  Unit: [facility] PCU TELEMETRY    Received request from patient. Upon review of chart and communication with care team, patient's decision making abilities are not in question. . Patient was/were present in the room during visit. Goals of ACP Conversation:  Discuss advance care planning documents  Facilitate a discussion related to patient's goals of care as they align with the patient's values and beliefs. Health Care Decision Makers:       Primary Decision Maker: Baltazar Vallecillo - Child - [phone]  Summary:  Completed New Documents  Documented Next of Kin, per patient report    Advance Care Planning Documents (Patient Wishes):  None     Electronically signed by Paige Cabrera on 12/1/2022 at 1:34 PM        I spoke with Brown Freeman about his desires for decision making for healthcare. I shared with him the HCPOA and [place]K laws in the state of [place], and he stated he \"wanted to leave things the way they were,\" and was primarily concerned that his family, specifically his significant other, would be able to come visit once he's out of precautions. Patient declines assistance with HCPOA forms at this time.     Tee Gupta

## 2022-12-01 NOTE — PROGRESS NOTES
Report given to Benny Liu RN. Pt transported to [address] [place] [address] in stable condition at this time.

## 2022-12-01 NOTE — ED NOTES
Oxygen titrated down to 10L NC at this time. Will continue to record, monitor and support patients needs.       Pasquale Brewer5, RN  12/01/22 1264

## 2022-12-01 NOTE — ACP (ADVANCE CARE PLANNING)
Advance Care Planning     General Advance Care Planning (ACP) Conversation    Date of Conversation: 11/30/2022  Conducted with: Patient with Decision Making Capacity    Healthcare Decision Maker:    Primary Decision Maker: Zach Beaver - [place] - [phone]  Click here to complete Healthcare Decision Makers including selection of the Healthcare Decision Maker Relationship (ie \"Primary\"). Today we documented Decision Maker(s) consistent with Legal Next of Kin hierarchy. Content/Action Overview:  DECLINED ACP Conversation - will revisit periodically.  Attempted the ACP conversation with pt and he declined the conversation     Length of Voluntary ACP Conversation in minutes:  <16 minutes (Non-Billable)    Ryley Souza MSW, KATJAS

## 2022-12-01 NOTE — PROGRESS NOTES
4 Eyes Skin Assessment     The patient is being assess for   Admission    I agree that 2 RN's have performed a thorough Head to Toe Skin Assessment on the patient. ALL assessment sites listed below have been assessed. Areas assessed for pressure by both nurses:   [x]   Head, Face, and Ears - patient with high flow nasal cannula  [x]   Shoulders, Back, and Chest, Abdomen  [x]   Arms, Elbows, and Hands   [x]   Coccyx, Sacrum, and Ischium  [x]   Legs, Feet, and Heels        Skin Assessed Under all Medical Devices by both nurses:  O2 device tubing              All Mepilex Borders were peeled back and area peeked at by both nurses:  No: NONE  Please list where Mepilex Borders are located:  NO LOCATIONS              **SHARE this note so that the co-signing nurse is able to place an eSignature**    Co-signer eSignature: Electronically signed by Сергей Douglas RN on 12/1/22 at 5:52 PM EST    Does the Patient have Skin Breakdown related to pressure?   No   No    Kenneth Prevention initiated:  No   Wound Care Orders initiated:  No      [facility] nurse consulted for Pressure Injury (Stage 3,4, Unstageable, DTI, NWPT, Complex wounds)and New or Established Ostomies:  No      Primary Nurse eSignature: Electronically signed by Sameer Harrison RN on 12/1/22 at 1:04 PM EST

## 2022-12-01 NOTE — CONSULTS
COVID-19, hospitalized patients (off-label use): Note: For use in hospitalized patients with significant oxygen requirements (eg, high-flow oxygen, noninvasive ventilation, mechanical ventilation, extracorporeal membrane oxygenation) and for those with lower but increasing oxygen requirements and evidence of systemic inflammation (eg, C-reactive protein ?75 mg/L) (Ref). IV: 8 mg/kg once (maximum dose: 800 mg) as part of an appropriate combination regimen (Ref). If clinical improvement does not occur, a second dose may be considered ?8 hours after the first dose (Ref). Note: Use with caution in patients with ANC <500 to 1,000/mm3, platelets <35,527/MF3, ALT >5 to 10 times ULN, high risk of GI perforation, or uncontrolled, serious infection besides COVID-19 (Ref).     Ann Marie Moses, Pharm D.12/1/2022 5:42 AM

## 2022-12-01 NOTE — H&P
Hospital Medicine History & Physical      PCP: Carolin Bruce    Date of Admission: 11/30/2022    Date of Service: Pt seen/examined on 10/30/22 and Admitted to Inpatient with expected LOS greater than two midnights due to medical therapy. Chief Complaint:  SOB      History Of Present Illness:       67 y.o. male with history of pulmonary fibrosis chronically on 2 lpm NC O2, notes increased sob / delatorre x 2 days. He's had a non productive cough, loss of appetite due to change in taste x 3-4 days and an episode of nausea and vomiting today while in the shower. He denies worsening sob after vomiting, denies hematemesis, fever, chills, chest pain, but due to progressive hypoxemia he consults ER today where he is currently seen on 10lpm high flow in no resp distress, speaking in full sentences coherently    Past Medical History:          Diagnosis Date    Anemia     BPH (benign prostatic hypertrophy)     CAD (coronary artery disease)     Diabetes mellitus ([facility]     History of colonic polyps     Hyperlipidemia     Hypertension     Hypertrophy of prostate without urinary obstruction and other lower urinary tract symptoms (LUTS) 11/1/2011       Past Surgical History:          Procedure Laterality Date    CARDIAC CATHETERIZATION  3/2013    no stents    COLONOSCOPY         Medications Prior to Admission:      Prior to Admission medications    Medication Sig Start Date End Date Taking? Authorizing Provider   lisinopril (PRINIVIL;ZESTRIL) 10 MG tablet Take 10 mg by mouth daily. Historical Provider, MD   isosorbide mononitrate (IMDUR) 30 MG CR tablet Take 30 mg by mouth daily. Historical Provider, MD   aspirin 81 MG EC tablet Take 81 mg by mouth daily. Historical Provider, MD   metformin (GLUCOPHAGE) 1000 MG tablet Take 1,000 mg by mouth 2 times daily (with meals). Historical Provider, MD   glyBURIDE (DIABETA) 5 MG tablet Take 10 mg by mouth daily (with breakfast).     Historical Provider, MD   atenolol (TENORMIN) 100 MG tablet Take 100 mg by mouth daily. Historical Provider, MD   amLODIPine (NORVASC) 5 MG tablet Take 1 tablet by mouth daily. 11/9/12   Lena Karimi MD   simvastatin (ZOCOR) 20 MG tablet Take 1 tablet by mouth nightly. 11/7/12   Lena Karimi MD       Allergies:  Patient has no known allergies. Social History:         TOBACCO:   reports that he has quit smoking. He has never used smokeless tobacco.  ETOH:   reports no history of alcohol use. Family History:             Problem Relation Age of Onset    Diabetes Mother     Heart Disease Mother     High Cholesterol Mother     Diabetes Father     Heart Disease Father     High Cholesterol Father     Diabetes Sister     Heart Disease Sister     High Cholesterol Sister     Diabetes Brother     Heart Disease Brother     High Cholesterol Brother        REVIEW OF SYSTEMS:   Pertinent positives as noted in the HPI. All other systems reviewed and negative. PHYSICAL EXAM PERFORMED:    /72   Pulse 68   Temp 98.8 °F (37.1 °C)   Resp 26   Ht 5' 9\" (1.753 m)   Wt 220 lb (99.8 kg)   SpO2 96%   BMI 32.49 kg/m²     General appearance:  No apparent distress, appears stated age and cooperative. HEENT:  Normal cephalic, atraumatic without obvious deformity. Pupils equal, round, and reactive to light. Extra ocular muscles intact. Conjunctivae/corneas clear. Neck: Supple, with full range of motion. No jugular venous distention. Trachea midline. Respiratory:  Normal respiratory effort. Diminished breath sounds, no wheeze, no rhonchi  Cardiovascular:  Regular rate and rhythm with normal S1/S2 without murmurs, rubs or gallops. Abdomen: Soft, non-tender, non-distended with normal bowel sounds. Musculoskeletal:  No clubbing, cyanosis or edema bilaterally. No calf tenderness  Skin: Skin color, texture, turgor normal.  No rashes or lesions.   Neurologic:  grossly non-focal.  Psychiatric:  Alert and oriented, thought content appropriate, normal insight         Labs:     Recent Labs     11/30/22  1623   WBC 12.9*   HGB 15.0   HCT 45.1        Recent Labs     11/30/22  1623   *   K 5.2*   CL 99   CO2 23   BUN 21*   CREATININE 1.0   CALCIUM 9.8     Recent Labs     11/30/22  1623   AST 40*   ALT 11   BILITOT 0.7   ALKPHOS 116     No results for input(s): INR in the last 72 hours. Recent Labs     11/30/22  1623   TROPONINI <0.01       Urinalysis:      Lab Results   Component Value Date/Time    NITRU Negative 05/22/2020 04:24 PM    WBCUA 0-2 05/22/2020 04:24 PM    RBCUA 0-2 05/22/2020 04:24 PM    BLOODU MODERATE 05/22/2020 04:24 PM    SPECGRAV 1.010 05/22/2020 04:24 PM    GLUCOSEU >=1000 05/22/2020 04:24 PM       Radiology:        CT CHEST PULMONARY EMBOLISM W CONTRAST   Final Result   Images are significantly degraded by respiratory and cardiac motion. No evidence of pulmonary embolism. Extensive amount of ground-glass densities throughout both lungs near   completely opacifying lung parenchyma bilaterally concerning for multifocal   pneumonia. Fibrotic changes throughout both lungs, predominantly affecting the lung   periphery. Findings were present on prior CT examination. Mild cardiomegaly. RECOMMENDATIONS:   Unavailable         XR CHEST PORTABLE   Final Result   1. Findings likely reflect acute pneumonia bilateral mid and lower lungs on a   background of chronic pulmonary disease. 2. Calcific atherosclerosis aorta. 3. Cardiomegaly.              ASSESSMENT:    Active Hospital Problems    Diagnosis Date Noted    Pneumonia due to COVID-19 virus [U07.1, J12.82] 11/30/2022     Priority: Medium         PLAN:    Acute on chronic resp failure  - decadron  - remains on high flow, will consult pharm for tocilizumab, crp ordered    DM  - corrective ISS    DVT Prophylaxis: lovenox  Diet: No diet orders on file  Code Status: No Order       Nobie Koyanagi, MD    Thank you Ivone Mora for

## 2022-12-01 NOTE — PROGRESS NOTES
Progress Note    Admit Date:  11/30/2022    -pmhx of pulmonary fibrosis, on 2 L O2 chronically, DM, CAD, HTN, HLD, BPH  -presented for shortness of breath, cough, hemoptysis   -now on 10 L high flow NC     Subjective:  Mr. Paul Sharma today feels better, shortness of breath is fine. He is just complaining of cough and runny nose. Hemoptysis has improved today. Objective:   Patient Vitals for the past 4 hrs:   Temp Temp src Pulse Resp SpO2   12/01/22 0800 98.1 °F (36.7 °C) Oral 63 (!) 36 100 %          Intake/Output Summary (Last 24 hours) at 12/1/2022 1122  Last data filed at 12/1/2022 1106  Gross per 24 hour   Intake --   Output 800 ml   Net -800 ml       Physical Exam:    Gen: No distress. Alert. Pleasant male   Eyes: PERRL. No sclera icterus. No conjunctival injection. Neck: No JVD. Trachea midline. Resp: No accessory muscle use. No crackles. No wheezes. ++Diminished breath sounds with some rhonchi   CV: Regular rate. Regular rhythm. No murmur. No rub. No edema. Peripheral Pulses: +2 palpable, equal bilaterally   GI: Non-tender. Non-distended. Normal bowel sounds. Skin: Warm and dry. No nodule on exposed extremities. No rash on exposed extremities. M/S: No cyanosis. No joint deformity. No clubbing. Neuro: Awake. Grossly nonfocal    Psych: Oriented x 3. No anxiety or agitation.          Medications:  dexamethasone, 6 mg, Q24H  aspirin, 81 mg, Daily  atenolol, 100 mg, Daily  isosorbide mononitrate, 60 mg, Daily  atorvastatin, 80 mg, Daily  sodium chloride flush, 5-40 mL, 2 times per day  enoxaparin, 30 mg, BID  insulin lispro, 0-4 Units, TID WC  insulin lispro, 0-4 Units, Nightly      PRN Medications:  sodium chloride flush, 5-40 mL, PRN  sodium chloride, , PRN  ondansetron, 4 mg, Q8H PRN   Or  ondansetron, 4 mg, Q6H PRN  polyethylene glycol, 17 g, Daily PRN  acetaminophen, 650 mg, Q6H PRN   Or  acetaminophen, 650 mg, Q6H PRN  glucose, 4 tablet, PRN  dextrose bolus, 125 mL, PRN   Or  dextrose bolus, 250 mL, PRN  glucagon (rDNA), 1 mg, PRN  dextrose, , Continuous PRN          Data:  CBC:   Recent Labs     11/30/22  1623 12/01/22  0524   WBC 12.9* 8.2   HGB 15.0 14.6   HCT 45.1 42.7   MCV 94.4 94.0    192     BMP:   Recent Labs     11/30/22  1623 12/01/22  0524   * 137   K 5.2* 4.4   CL 99 101   CO2 23 22   BUN 21* 26*   CREATININE 1.0 1.0     LIVER PROFILE:   Recent Labs     11/30/22  1623   AST 40*   ALT 11   BILITOT 0.7   ALKPHOS 116         CULTURES  COVID detected  Influenza A and B not detected  Blood cultures pending      RADIOLOGY  CT CHEST PULMONARY EMBOLISM W CONTRAST   Final Result   Images are significantly degraded by respiratory and cardiac motion. No evidence of pulmonary embolism. Extensive amount of ground-glass densities throughout both lungs near   completely opacifying lung parenchyma bilaterally concerning for multifocal   pneumonia. Fibrotic changes throughout both lungs, predominantly affecting the lung   periphery. Findings were present on prior CT examination. Mild cardiomegaly. RECOMMENDATIONS:   Unavailable         XR CHEST PORTABLE   Final Result   1. Findings likely reflect acute pneumonia bilateral mid and lower lungs on a   background of chronic pulmonary disease. 2. Calcific atherosclerosis aorta. 3. Cardiomegaly.                Assessment/Plan:  #Acute on chronic hypoxic respiratory failure   #Pulmonary fibrosis   #COVID pneumonia   -has pulmonologist reportedly at [place] but is not on any inhalers or treatment reportedly   -on 2 L O2 baseline, requiring 10 high flow nasal cannula   -continue to wean as tolerated   -WBC resolved today, LA WNLs, procalc neg   -Ct scan as above   -continue decadron 6 mg daily--> changed to IV solumedrol   -continue breathing treatments  -given tocilizumab   -on IV rocephin per pulm   -blood cultures pending   -strep and legionella pending   -pulmonology consulted     #Elevated d dimer  -CTPA neg for PE #Hyperkalemia   -resolved today   -continue to monitor     #Cardiomegaly   -no echo in EMR  -echo pending     #DM type 2   -holding oral regimen   -SSI  -continue to monitor BG     #CAD   -on ASA, statin, imdur, and BB     #HTN   -atenolol     #HLD   -on statin     DVT Prophylaxis: Lovenox   Diet: ADULT DIET;  Regular; 4 carb choices (60 gm/meal)  Code Status: Full Code    CLIFFORD Gomez  12/01/22  2:00 PM

## 2022-12-02 LAB
ANION GAP SERPL CALCULATED.3IONS-SCNC: 12 MMOL/L (ref 3–16)
BASOPHILS ABSOLUTE: 0 K/UL (ref 0–0.2)
BASOPHILS RELATIVE PERCENT: 0.1 %
BUN BLDV-MCNC: 34 MG/DL (ref 7–20)
CALCIUM SERPL-MCNC: 9.2 MG/DL (ref 8.3–10.6)
CHLORIDE BLD-SCNC: 101 MMOL/L (ref 99–110)
CO2: 22 MMOL/L (ref 21–32)
CREAT SERPL-MCNC: 1 MG/DL (ref 0.8–1.3)
EOSINOPHILS ABSOLUTE: 0 K/UL (ref 0–0.6)
EOSINOPHILS RELATIVE PERCENT: 0 %
ESTIMATED AVERAGE GLUCOSE: 151.3 MG/DL
GFR SERPL CREATININE-BSD FRML MDRD: >60 ML/MIN/{1.73_M2}
GLUCOSE BLD-MCNC: 176 MG/DL (ref 70–99)
GLUCOSE BLD-MCNC: 190 MG/DL (ref 70–99)
GLUCOSE BLD-MCNC: 221 MG/DL (ref 70–99)
GLUCOSE BLD-MCNC: 235 MG/DL (ref 70–99)
GLUCOSE BLD-MCNC: 272 MG/DL (ref 70–99)
HBA1C MFR BLD: 6.9 %
HCT VFR BLD CALC: 44.3 % (ref 40.5–52.5)
HEMOGLOBIN: 14.9 G/DL (ref 13.5–17.5)
L. PNEUMOPHILA SEROGP 1 UR AG: NORMAL
LYMPHOCYTES ABSOLUTE: 0.6 K/UL (ref 1–5.1)
LYMPHOCYTES RELATIVE PERCENT: 8.5 %
MCH RBC QN AUTO: 31.3 PG (ref 26–34)
MCHC RBC AUTO-ENTMCNC: 33.7 G/DL (ref 31–36)
MCV RBC AUTO: 93.1 FL (ref 80–100)
MONOCYTES ABSOLUTE: 0.2 K/UL (ref 0–1.3)
MONOCYTES RELATIVE PERCENT: 2.5 %
NEUTROPHILS ABSOLUTE: 6.7 K/UL (ref 1.7–7.7)
NEUTROPHILS RELATIVE PERCENT: 88.9 %
PDW BLD-RTO: 13.6 % (ref 12.4–15.4)
PERFORMED ON: ABNORMAL
PLATELET # BLD: 211 K/UL (ref 135–450)
PMV BLD AUTO: 7.5 FL (ref 5–10.5)
POTASSIUM REFLEX MAGNESIUM: 5.2 MMOL/L (ref 3.5–5.1)
RBC # BLD: 4.75 M/UL (ref 4.2–5.9)
SODIUM BLD-SCNC: 135 MMOL/L (ref 136–145)
STREP PNEUMONIAE ANTIGEN, URINE: NORMAL
WBC # BLD: 7.6 K/UL (ref 4–11)

## 2022-12-02 PROCEDURE — 93306 TTE W/DOPPLER COMPLETE: CPT

## 2022-12-02 PROCEDURE — 2700000000 HC OXYGEN THERAPY PER DAY

## 2022-12-02 PROCEDURE — 2580000003 HC RX 258: Performed by: INTERNAL MEDICINE

## 2022-12-02 PROCEDURE — 94761 N-INVAS EAR/PLS OXIMETRY MLT: CPT

## 2022-12-02 PROCEDURE — 99233 SBSQ HOSP IP/OBS HIGH 50: CPT | Performed by: INTERNAL MEDICINE

## 2022-12-02 PROCEDURE — 2060000000 HC ICU INTERMEDIATE R&B

## 2022-12-02 PROCEDURE — 2580000003 HC RX 258: Performed by: HOSPITALIST

## 2022-12-02 PROCEDURE — 80048 BASIC METABOLIC PNL TOTAL CA: CPT

## 2022-12-02 PROCEDURE — 6360000002 HC RX W HCPCS: Performed by: HOSPITALIST

## 2022-12-02 PROCEDURE — 36415 COLL VENOUS BLD VENIPUNCTURE: CPT

## 2022-12-02 PROCEDURE — 6360000002 HC RX W HCPCS: Performed by: INTERNAL MEDICINE

## 2022-12-02 PROCEDURE — 6370000000 HC RX 637 (ALT 250 FOR IP): Performed by: HOSPITALIST

## 2022-12-02 PROCEDURE — 85025 COMPLETE CBC W/AUTO DIFF WBC: CPT

## 2022-12-02 RX ADMIN — INSULIN LISPRO 1 UNITS: 100 INJECTION, SOLUTION INTRAVENOUS; SUBCUTANEOUS at 16:55

## 2022-12-02 RX ADMIN — SODIUM CHLORIDE, PRESERVATIVE FREE 10 ML: 5 INJECTION INTRAVENOUS at 20:47

## 2022-12-02 RX ADMIN — METHYLPREDNISOLONE SODIUM SUCCINATE 40 MG: 40 INJECTION, POWDER, FOR SOLUTION INTRAMUSCULAR; INTRAVENOUS at 16:54

## 2022-12-02 RX ADMIN — SODIUM CHLORIDE, PRESERVATIVE FREE 10 ML: 5 INJECTION INTRAVENOUS at 07:51

## 2022-12-02 RX ADMIN — ATORVASTATIN CALCIUM 80 MG: 40 TABLET, FILM COATED ORAL at 07:51

## 2022-12-02 RX ADMIN — ENOXAPARIN SODIUM 30 MG: 100 INJECTION SUBCUTANEOUS at 20:47

## 2022-12-02 RX ADMIN — ASPIRIN 81 MG: 81 TABLET, COATED ORAL at 07:51

## 2022-12-02 RX ADMIN — ISOSORBIDE MONONITRATE 60 MG: 60 TABLET ORAL at 07:51

## 2022-12-02 RX ADMIN — METHYLPREDNISOLONE SODIUM SUCCINATE 40 MG: 40 INJECTION, POWDER, FOR SOLUTION INTRAMUSCULAR; INTRAVENOUS at 10:25

## 2022-12-02 RX ADMIN — SODIUM CHLORIDE, PRESERVATIVE FREE 10 ML: 5 INJECTION INTRAVENOUS at 20:45

## 2022-12-02 RX ADMIN — METHYLPREDNISOLONE SODIUM SUCCINATE 40 MG: 40 INJECTION, POWDER, FOR SOLUTION INTRAMUSCULAR; INTRAVENOUS at 03:45

## 2022-12-02 RX ADMIN — METHYLPREDNISOLONE SODIUM SUCCINATE 40 MG: 40 INJECTION, POWDER, FOR SOLUTION INTRAMUSCULAR; INTRAVENOUS at 20:47

## 2022-12-02 RX ADMIN — ATENOLOL 100 MG: 50 TABLET ORAL at 07:51

## 2022-12-02 RX ADMIN — ENOXAPARIN SODIUM 30 MG: 100 INJECTION SUBCUTANEOUS at 07:50

## 2022-12-02 RX ADMIN — CEFTRIAXONE SODIUM 1000 MG: 1 INJECTION, POWDER, FOR SOLUTION INTRAMUSCULAR; INTRAVENOUS at 13:16

## 2022-12-02 NOTE — PROGRESS NOTES
Physician Progress Note      Jeannine Guerrero  CSN #:                  [id number]  :                       1950  ADMIT DATE:       2022 4:08 PM  [address] [place] DATE:  RESPONDING  PROVIDER #:        Tammy Pimentel MD          QUERY TEXT:    Pt admitted with Covid. Pt noted to have  WBC 12.9  and RR 26. If possible,   please document in the progress notes and discharge summary if you are   evaluating and /or treating any of the following: The medical record reflects the following:  Risk Factors: Covid, pneumonia, acute respiratory failure  Clinical Indicators: WBC 12.9  and RR 26, procalcitonin is negative, lactate   is negative  Treatment: decadron, tocilizumab, monitor labs/images, monitor vitals, IVFs,   Patient 79% in triage on 4LNC then up to [facility] RN, [email or web address]  Options provided:  -- Sepsis due to Covid, present on admission  -- Sepsis due to Covid pneumonia, present on admission  -- Sepsis was ruled out  -- Other - I will add my own diagnosis  -- Disagree - Not applicable / Not valid  -- Disagree - Clinically unable to determine / Unknown  -- Refer to Clinical Documentation Reviewer    PROVIDER RESPONSE TEXT:    This patient has sepsis due to Covid which was present on admission.     Query created by: Dirk Fothergill on 2022 1:06 PM      Electronically signed by:  Tammy Pimentel MD 2022 4:57 PM

## 2022-12-02 NOTE — PROGRESS NOTES
P [facility]                                 Pulmonary Consult /Progress Note :                                                                  CHIEF COMPLAINT: SOB       COVID hypoxic, pulmonary fibrosis  HPI:   Doing better  SOB has improved   On 4 L   Base line 2 L  No chest pain  No hemoptysis     Objective:   PHYSICAL EXAM:    Blood pressure 128/72, pulse 70, temperature 97 °F (36.1 °C), temperature source Oral, resp. rate 18, height 5' 9\" (1.753 m), weight 176 lb 9.6 oz (80.1 kg), SpO2 92 %.' on RA  Gen: No distress. Eyes: PERRL. No sclera icterus. No conjunctival injection. ENT: No discharge. Pharynx clear. Neck: Trachea midline. No obvious mass. Resp: rales and rhonchi bilateral decrease breath sound   CV: Regular rate. Regular rhythm. No murmur or rub. No edema. GI: Non-tender. Non-distended. No hernia. Skin: Warm and dry. No nodule on exposed extremities. Lymph: No cervical LAD. No supraclavicular LAD. M/S: No cyanosis. No joint deformity. No clubbing. Neuro: Awake. Alert. Moves all four extremities. Psych: Oriented x 3. No anxiety.            DATA reviewed by me:   CXR  CT                 LABS/IMAGING:    CBC:  Lab Results   Component Value Date    WBC 7.6 12/02/2022    HGB 14.9 12/02/2022    HCT 44.3 12/02/2022    MCV 93.1 12/02/2022     12/02/2022    LYMPHOPCT 8.5 12/02/2022    RBC 4.75 12/02/2022    MCH 31.3 12/02/2022    MCHC 33.7 12/02/2022    RDW 13.6 12/02/2022    NEUTOPHILPCT 88.9 12/02/2022    MONOPCT 2.5 12/02/2022    EOSPCT 2.4 11/01/2011    BASOPCT 0.1 12/02/2022    NEUTROABS 6.7 12/02/2022    LYMPHSABS 0.6 (L) 12/02/2022    MONOSABS 0.2 12/02/2022    EOSABS 0.0 12/02/2022    BASOSABS 0.0 12/02/2022       Recent Labs     12/02/22  0544 12/01/22  0524 11/30/22  1623   WBC 7.6 8.2 12.9*   HGB 14.9 14.6 15.0   HCT 44.3 42.7 45.1   MCV 93.1 94.0 94.4    192 174         BMP:   Recent Labs 11/30/22  1623 12/01/22  0524 12/02/22  0544   * 137 135*   K 5.2* 4.4 5.2*   CL 99 101 101   CO2 23 22 22   BUN 21* 26* 34*   CREATININE 1.0 1.0 1.0         MG: No results found for: MG  Ca/Phos:   Lab Results   Component Value Date    CALCIUM 9.2 12/02/2022    PHOS 3.5 11/01/2011     LIVER PROFILE:   Recent Labs     11/30/22  1623   AST 40*   ALT 11   BILITOT 0.7   ALKPHOS 116         PT/INR: No results for input(s): PROTIME, INR in the last 72 hours. APTT: No results for input(s): APTT in the last 72 hours. Cardiac Enzymes:  Lab Results   Component Value Date    TROPONINI <0.01 11/30/2022       Assessment:       -Acute hypoxic respiratory failure   -COVID pneumonia   -Acute COPD exacerbation   -ILD ,seems [facility] will try to get records   -Possible CHF       Plan:      *-will keep IVF steroids high dose , COVID or flare of his ILD   *-solumedrol 40 mg IV q 6 .start wean tomorrow 40 mg tid   *-consider bronch if not better,r/o oppurtunistic infection,since better will hold   *- SOB may get worse ,transfer ICU   *_ already given actemra by primary   Check hepatitis panel   *- need records about his ILD and if he had diagnosis and what treatment he had   *-cover with Iv abx   *_ for up for infections   BD  Follow Pan culture  Will monitor  BIPAP if any idstress  DVT px       Thank you very much for allowing me to participate in the care of this pleasant patient , should you have any questions ,please do not hesitate to contact me      Aldo Colvin MD,[facility]  Pulmonary&Critical [facility] Medicine    Ivis Williamson    NOTE: This report was transcribed using voice recognition software. Every effort was made to ensure accuracy; however, inadvertent computerized transcription errors may be present.

## 2022-12-02 NOTE — PROGRESS NOTES
Progress Note    Admit Date:  11/30/2022    -pmhx of pulmonary fibrosis, on 2 L O2 chronically, DM, CAD, HTN, HLD, BPH  -presented for shortness of breath, cough, hemoptysis   -now on 10 L high flow NC     Subjective:  SOB continues to slowly improve. Down to 4l/min today. Baseline 2l/min with hx of ILD pulm fibrosis. Objective:   No data found. Intake/Output Summary (Last 24 hours) at 12/2/2022 1359  Last data filed at 12/2/2022 9281  Gross per 24 hour   Intake 222 ml   Output 1750 ml   Net -1528 ml         Physical Exam:    Gen: No distress. Alert. Pleasant male   Eyes: PERRL. No sclera icterus. No conjunctival injection. Neck: No JVD. Trachea midline. Resp: No accessory muscle use. No crackles. No wheezes. ++Diminished breath sounds with some rhonchi   CV: Regular rate. Regular rhythm. No murmur. No rub. No edema. Peripheral Pulses: +2 palpable, equal bilaterally   GI: Non-tender. Non-distended. Normal bowel sounds. Skin: Warm and dry. No nodule on exposed extremities. No rash on exposed extremities. M/S: No cyanosis. No joint deformity. No clubbing. Neuro: Awake. Grossly nonfocal    Psych: Oriented x 3. No anxiety or agitation.          Medications:  methylPREDNISolone, 40 mg, Q6H  cefTRIAXone (ROCEPHIN) IV, 1,000 mg, Q24H  aspirin, 81 mg, Daily  atenolol, 100 mg, Daily  isosorbide mononitrate, 60 mg, Daily  atorvastatin, 80 mg, Daily  sodium chloride flush, 5-40 mL, 2 times per day  enoxaparin, 30 mg, BID  insulin lispro, 0-4 Units, TID WC  insulin lispro, 0-4 Units, Nightly    PRN Medications:  HYDROcodone 5 mg - acetaminophen, 1 tablet, Q4H PRN   Or  HYDROcodone 5 mg - acetaminophen, 2 tablet, Q4H PRN  perflutren lipid microspheres, 1.5 mL, ONCE PRN  sodium chloride flush, 5-40 mL, PRN  sodium chloride, , PRN  ondansetron, 4 mg, Q8H PRN   Or  ondansetron, 4 mg, Q6H PRN  polyethylene glycol, 17 g, Daily PRN  acetaminophen, 650 mg, Q6H PRN   Or  acetaminophen, 650 mg, Q6H PRN  glucose, 4 tablet, PRN  dextrose bolus, 125 mL, PRN   Or  dextrose bolus, 250 mL, PRN  glucagon (rDNA), 1 mg, PRN  dextrose, , Continuous PRN        Data:  CBC:   Recent Labs     11/30/22  1623 12/01/22  0524 12/02/22  0544   WBC 12.9* 8.2 7.6   HGB 15.0 14.6 14.9   HCT 45.1 42.7 44.3   MCV 94.4 94.0 93.1    192 211       BMP:   Recent Labs     11/30/22  1623 12/01/22  0524 12/02/22  0544   * 137 135*   K 5.2* 4.4 5.2*   CL 99 101 101   CO2 23 22 22   BUN 21* 26* 34*   CREATININE 1.0 1.0 1.0       LIVER PROFILE:   Recent Labs     11/30/22  1623   AST 40*   ALT 11   BILITOT 0.7   ALKPHOS 116           CULTURES  COVID detected  Influenza A and B not detected  Blood cultures pending      RADIOLOGY  CT CHEST PULMONARY EMBOLISM W CONTRAST   Final Result   Images are significantly degraded by respiratory and cardiac motion. No evidence of pulmonary embolism. Extensive amount of ground-glass densities throughout both lungs near   completely opacifying lung parenchyma bilaterally concerning for multifocal   pneumonia. Fibrotic changes throughout both lungs, predominantly affecting the lung   periphery. Findings were present on prior CT examination. Mild cardiomegaly. RECOMMENDATIONS:   Unavailable         XR CHEST PORTABLE   Final Result   1. Findings likely reflect acute pneumonia bilateral mid and lower lungs on a   background of chronic pulmonary disease. 2. Calcific atherosclerosis aorta. 3. Cardiomegaly. Assessment/Plan:      #Acute on chronic hypoxic respiratory failure   #Pulmonary fibrosis   #COVID pneumonia   -has pulmonologist reportedly at [place] but is not on any inhalers or treatment reportedly   -on 2 L O2 baseline, requiring 10 high flow nasal cannula up to 15l on presentation. -WBC resolved today, LA WNLs, procalc neg   -CT scan reviewed.    - IV solumedrol   - s/p tocilizumab   -on IV rocephin per pulm   -blood cultures pending   -strep and legionella pending   -pulmonology consulted    - O2 down to 4l/min improved today. #Elevated d dimer  -CTPA neg for PE       #Hyperkalemia   -resolved today       #Cardiomegaly   -no echo in EMR  -echo pending       #DM type 2   -holding oral regimen   -SSI  -continue to monitor BG       #CAD   -on ASA, statin, imdur, and BB       #HTN   -atenolol       #HLD   -on statin         DVT Prophylaxis: Lovenox   Diet: ADULT DIET;  Regular; 4 carb choices (60 gm/meal)  Code Status: Full Code    Vasyl Srinivasan MD  12/02/22  1:59 PM

## 2022-12-02 NOTE — CARE COORDINATION
INTERDISCIPLINARY PLAN OF CARE CONFERENCE    Date/Time: 12/2/2022 10:13 AM  Completed by: [place] [facility]      Patient Name:  Monserrat Freeman  YOB: 1950  Admitting Diagnosis: History of pulmonary fibrosis [Z87.09]  Acute on chronic respiratory failure with hypoxia ([facility] [address] [J96.21]  Pneumonia due to COVID-19 virus [U07.1, J12.82]     Admit Date/Time:  11/30/2022  4:08 PM    Chart reviewed. Interdisciplinary team contacted or reviewed plan related to patient progress and discharge plans. Disciplines included Case Management, Nursing, and Dietitian. Current Status:ongoing  PT/OT recommendation for discharge plan of care: n/a    Expected D/C Disposition:  Home    Discharge Plan Comments: Chart review complete. Pt has O2 through the [facility]. Cm will continue to follow, please notify CM if needs or concerns arise.      Home O2 in place on admit: Yes

## 2022-12-02 NOTE — PROGRESS NOTES
Bedside report and transfer of care given to [facility]. Pt currently resting in bed with the call light within reach. Pt denies any other care needs at this time. Pt stable at this time.

## 2022-12-03 LAB
ANION GAP SERPL CALCULATED.3IONS-SCNC: 10 MMOL/L (ref 3–16)
BASOPHILS ABSOLUTE: 0 K/UL (ref 0–0.2)
BASOPHILS RELATIVE PERCENT: 0.1 %
BUN BLDV-MCNC: 40 MG/DL (ref 7–20)
CALCIUM SERPL-MCNC: 9 MG/DL (ref 8.3–10.6)
CHLORIDE BLD-SCNC: 97 MMOL/L (ref 99–110)
CO2: 22 MMOL/L (ref 21–32)
CREAT SERPL-MCNC: 0.9 MG/DL (ref 0.8–1.3)
EOSINOPHILS ABSOLUTE: 0 K/UL (ref 0–0.6)
EOSINOPHILS RELATIVE PERCENT: 0 %
GFR SERPL CREATININE-BSD FRML MDRD: >60 ML/MIN/{1.73_M2}
GLUCOSE BLD-MCNC: 238 MG/DL (ref 70–99)
GLUCOSE BLD-MCNC: 250 MG/DL (ref 70–99)
GLUCOSE BLD-MCNC: 290 MG/DL (ref 70–99)
GLUCOSE BLD-MCNC: 307 MG/DL (ref 70–99)
GLUCOSE BLD-MCNC: 334 MG/DL (ref 70–99)
HCT VFR BLD CALC: 43.3 % (ref 40.5–52.5)
HEMOGLOBIN: 14.5 G/DL (ref 13.5–17.5)
LYMPHOCYTES ABSOLUTE: 0.7 K/UL (ref 1–5.1)
LYMPHOCYTES RELATIVE PERCENT: 8.9 %
MCH RBC QN AUTO: 31.7 PG (ref 26–34)
MCHC RBC AUTO-ENTMCNC: 33.4 G/DL (ref 31–36)
MCV RBC AUTO: 95 FL (ref 80–100)
MONOCYTES ABSOLUTE: 0.4 K/UL (ref 0–1.3)
MONOCYTES RELATIVE PERCENT: 4.5 %
NEUTROPHILS ABSOLUTE: 7 K/UL (ref 1.7–7.7)
NEUTROPHILS RELATIVE PERCENT: 86.5 %
PDW BLD-RTO: 13.6 % (ref 12.4–15.4)
PERFORMED ON: ABNORMAL
PLATELET # BLD: 225 K/UL (ref 135–450)
PMV BLD AUTO: 7.6 FL (ref 5–10.5)
POTASSIUM REFLEX MAGNESIUM: 4.5 MMOL/L (ref 3.5–5.1)
RBC # BLD: 4.56 M/UL (ref 4.2–5.9)
SODIUM BLD-SCNC: 129 MMOL/L (ref 136–145)
WBC # BLD: 8.1 K/UL (ref 4–11)

## 2022-12-03 PROCEDURE — 6370000000 HC RX 637 (ALT 250 FOR IP): Performed by: HOSPITALIST

## 2022-12-03 PROCEDURE — 80048 BASIC METABOLIC PNL TOTAL CA: CPT

## 2022-12-03 PROCEDURE — 94761 N-INVAS EAR/PLS OXIMETRY MLT: CPT

## 2022-12-03 PROCEDURE — 2700000000 HC OXYGEN THERAPY PER DAY

## 2022-12-03 PROCEDURE — 6360000002 HC RX W HCPCS: Performed by: INTERNAL MEDICINE

## 2022-12-03 PROCEDURE — 2580000003 HC RX 258: Performed by: INTERNAL MEDICINE

## 2022-12-03 PROCEDURE — 2060000000 HC ICU INTERMEDIATE R&B

## 2022-12-03 PROCEDURE — 6360000002 HC RX W HCPCS: Performed by: HOSPITALIST

## 2022-12-03 PROCEDURE — 99233 SBSQ HOSP IP/OBS HIGH 50: CPT | Performed by: INTERNAL MEDICINE

## 2022-12-03 PROCEDURE — 36415 COLL VENOUS BLD VENIPUNCTURE: CPT

## 2022-12-03 PROCEDURE — 2580000003 HC RX 258: Performed by: HOSPITALIST

## 2022-12-03 PROCEDURE — 85025 COMPLETE CBC W/AUTO DIFF WBC: CPT

## 2022-12-03 RX ORDER — METHYLPREDNISOLONE SODIUM SUCCINATE 40 MG/ML
40 INJECTION, POWDER, LYOPHILIZED, FOR SOLUTION INTRAMUSCULAR; INTRAVENOUS EVERY 8 HOURS
Status: DISCONTINUED | OUTPATIENT
Start: 2022-12-03 | End: 2022-12-06

## 2022-12-03 RX ADMIN — ATENOLOL 100 MG: 50 TABLET ORAL at 08:49

## 2022-12-03 RX ADMIN — METHYLPREDNISOLONE SODIUM SUCCINATE 40 MG: 40 INJECTION, POWDER, FOR SOLUTION INTRAMUSCULAR; INTRAVENOUS at 03:24

## 2022-12-03 RX ADMIN — METHYLPREDNISOLONE SODIUM SUCCINATE 40 MG: 40 INJECTION, POWDER, FOR SOLUTION INTRAMUSCULAR; INTRAVENOUS at 08:52

## 2022-12-03 RX ADMIN — ASPIRIN 81 MG: 81 TABLET, COATED ORAL at 08:49

## 2022-12-03 RX ADMIN — INSULIN LISPRO 1 UNITS: 100 INJECTION, SOLUTION INTRAVENOUS; SUBCUTANEOUS at 08:55

## 2022-12-03 RX ADMIN — CEFTRIAXONE SODIUM 1000 MG: 1 INJECTION, POWDER, FOR SOLUTION INTRAMUSCULAR; INTRAVENOUS at 14:19

## 2022-12-03 RX ADMIN — INSULIN LISPRO 3 UNITS: 100 INJECTION, SOLUTION INTRAVENOUS; SUBCUTANEOUS at 17:46

## 2022-12-03 RX ADMIN — INSULIN LISPRO 2 UNITS: 100 INJECTION, SOLUTION INTRAVENOUS; SUBCUTANEOUS at 11:53

## 2022-12-03 RX ADMIN — SODIUM CHLORIDE, PRESERVATIVE FREE 10 ML: 5 INJECTION INTRAVENOUS at 20:03

## 2022-12-03 RX ADMIN — ISOSORBIDE MONONITRATE 60 MG: 60 TABLET ORAL at 08:49

## 2022-12-03 RX ADMIN — METHYLPREDNISOLONE SODIUM SUCCINATE 40 MG: 40 INJECTION, POWDER, FOR SOLUTION INTRAMUSCULAR; INTRAVENOUS at 17:40

## 2022-12-03 RX ADMIN — ATORVASTATIN CALCIUM 80 MG: 40 TABLET, FILM COATED ORAL at 08:49

## 2022-12-03 RX ADMIN — ENOXAPARIN SODIUM 30 MG: 100 INJECTION SUBCUTANEOUS at 20:02

## 2022-12-03 RX ADMIN — ENOXAPARIN SODIUM 30 MG: 100 INJECTION SUBCUTANEOUS at 08:49

## 2022-12-03 RX ADMIN — INSULIN LISPRO 4 UNITS: 100 INJECTION, SOLUTION INTRAVENOUS; SUBCUTANEOUS at 20:02

## 2022-12-03 RX ADMIN — SODIUM CHLORIDE, PRESERVATIVE FREE 10 ML: 5 INJECTION INTRAVENOUS at 08:48

## 2022-12-03 NOTE — PROGRESS NOTES
Progress Note    Admit Date:  11/30/2022    -pmhx of pulmonary fibrosis, on 2 L O2 chronically, DM, CAD, HTN, HLD, BPH  -presented for shortness of breath, cough, hemoptysis   -now on 10 L high flow NC     Subjective:    SOB continues to slowly improve. Down to 4l/min today. Baseline 2l/min with hx of ILD pulm fibrosis. His cough is improved    Objective:   Patient Vitals for the past 4 hrs:   BP Temp Temp src Pulse Resp SpO2   12/03/22 1415 116/71 97.2 °F (36.2 °C) Oral 63 18 93 %            Intake/Output Summary (Last 24 hours) at 12/3/2022 1640  Last data filed at 12/3/2022 1515  Gross per 24 hour   Intake 1801 ml   Output 4150 ml   Net -2349 ml         Physical Exam:    Gen: No distress. Alert. Pleasant male   Eyes: PERRL. No sclera icterus. No conjunctival injection. Neck: No JVD. Trachea midline. Resp: No accessory muscle use. No crackles. No wheezes. ++Diminished breath sounds with some rhonchi   CV: Regular rate. Regular rhythm. No murmur. No rub. No edema. Peripheral Pulses: +2 palpable, equal bilaterally   GI: Non-tender. Non-distended. Normal bowel sounds. Skin: Warm and dry. No nodule on exposed extremities. No rash on exposed extremities. M/S: No cyanosis. No joint deformity. No clubbing. Neuro: Awake. Grossly nonfocal    Psych: Oriented x 3. No anxiety or agitation.          Medications:  methylPREDNISolone, 40 mg, Q8H  cefTRIAXone (ROCEPHIN) IV, 1,000 mg, Q24H  aspirin, 81 mg, Daily  atenolol, 100 mg, Daily  isosorbide mononitrate, 60 mg, Daily  atorvastatin, 80 mg, Daily  sodium chloride flush, 5-40 mL, 2 times per day  enoxaparin, 30 mg, BID  insulin lispro, 0-4 Units, TID WC  insulin lispro, 0-4 Units, Nightly    PRN Medications:  HYDROcodone 5 mg - acetaminophen, 1 tablet, Q4H PRN   Or  HYDROcodone 5 mg - acetaminophen, 2 tablet, Q4H PRN  perflutren lipid microspheres, 1.5 mL, ONCE PRN  sodium chloride flush, 5-40 mL, PRN  sodium chloride, , PRN  ondansetron, 4 mg, Q8H PRN Or  ondansetron, 4 mg, Q6H PRN  polyethylene glycol, 17 g, Daily PRN  acetaminophen, 650 mg, Q6H PRN   Or  acetaminophen, 650 mg, Q6H PRN  glucose, 4 tablet, PRN  dextrose bolus, 125 mL, PRN   Or  dextrose bolus, 250 mL, PRN  glucagon (rDNA), 1 mg, PRN  dextrose, , Continuous PRN        Data:  CBC:   Recent Labs     12/01/22 0524 12/02/22  0544 12/03/22  0434   WBC 8.2 7.6 8.1   HGB 14.6 14.9 14.5   HCT 42.7 44.3 43.3   MCV 94.0 93.1 95.0    211 225       BMP:   Recent Labs     12/01/22 0524 12/02/22  0544 12/03/22  0434    135* 129*   K 4.4 5.2* 4.5    101 97*   CO2 22 22 22   BUN 26* 34* 40*   CREATININE 1.0 1.0 0.9       LIVER PROFILE:   No results for input(s): AST, ALT, LIPASE, BILIDIR, BILITOT, ALKPHOS in the last 72 hours. Invalid input(s): AMYLASE,  ALB        CULTURES  COVID detected  Influenza A and B not detected  Blood cultures pending      RADIOLOGY  CT CHEST PULMONARY EMBOLISM W CONTRAST   Final Result   Images are significantly degraded by respiratory and cardiac motion. No evidence of pulmonary embolism. Extensive amount of ground-glass densities throughout both lungs near   completely opacifying lung parenchyma bilaterally concerning for multifocal   pneumonia. Fibrotic changes throughout both lungs, predominantly affecting the lung   periphery. Findings were present on prior CT examination. Mild cardiomegaly. RECOMMENDATIONS:   Unavailable         XR CHEST PORTABLE   Final Result   1. Findings likely reflect acute pneumonia bilateral mid and lower lungs on a   background of chronic pulmonary disease. 2. Calcific atherosclerosis aorta. 3. Cardiomegaly. ECHO   Conclusions      Summary   LV systolic function is normal with ejection fraction estimated 55%. No obvious segmental wall motion abnormalities. There is mild concentric left ventricular hypertrophy.    Grade II diastolic dysfunction with elevated left ventricular filling pressure. Mild mitral annular calcification. Aortic valve appears sclerotic but opens adequately. Mild-moderate mitral regurgitation. Mild tricuspid regurgitation. Systolic pulmonary artery pressure (SPAP) is estimated at 46 mmHg c/w mild   pulm HTN (Right atrial pressure of 8 mmHg). Pt COVID positive. Assessment/Plan:      #Acute on chronic hypoxic respiratory failure   #Pulmonary fibrosis   #COVID pneumonia   -has pulmonologist reportedly at [place] but is not on any inhalers or treatment reportedly   -on 2 L O2 baseline, requiring 10 high flow nasal cannula up to 15l on presentation. -WBC resolved today, LA WNLs, procalc neg   -CT scan reviewed. - IV solumedrol   - s/p tocilizumab   -on IV rocephin per pulm   -blood cultures pending   -strep and legionella pending   -pulmonology consulted    - O2 down to 4l/min improved today. #This patient has sepsis due to Covid which was present on admission. #Elevated d dimer  -CTPA neg for PE     #Hyperkalemia   -resolved today     #Cardiomegaly   -no echo in EMR, echo done G2 DD, normal EF    #DM type 2   -holding oral regimen   -SSI  -continue to monitor BG     #CAD   -on ASA, statin, imdur, and BB     #HTN   -atenolol       #HLD   -on statin     # Hyponatremia   sodium is down from 135-1 29. Will monitor. DVT Prophylaxis: Lovenox   Diet: ADULT DIET; Regular; 4 carb choices (60 gm/meal)  Code Status: Full Code      Continue to wean O2 as tolerated.    Increase activity as tolerated  monitor sodium levels  Likely discharge plan for home with home health care in the next few days once oxygen levels are stabilized     Naun Garcia MD  12/03/22  4:40 PM

## 2022-12-03 NOTE — PROGRESS NOTES
SpO2 dropped to 70% while standing/assisting with bed bath. Recovered after 5 minutes.  Back to 4L per NC.

## 2022-12-03 NOTE — PROGRESS NOTES
[facility]                                 Pulmonary Consult /Progress Note :                                                                  CHIEF COMPLAINT: SOB       COVID hypoxic, pulmonary fibrosis  HPI:   Doing better  SOB has improved   On 4 L   Base line 2 L  No chest pain  No hemoptysis     Objective:   PHYSICAL EXAM:    Blood pressure 132/80, pulse 61, temperature 97.6 °F (36.4 °C), temperature source Oral, resp. rate 20, height 5' 9\" (1.753 m), weight 173 lb 7 oz (78.7 kg), SpO2 94 %.' on RA  Gen: No distress. Eyes: PERRL. No sclera icterus. No conjunctival injection. ENT: No discharge. Pharynx clear. Neck: Trachea midline. No obvious mass. Resp: rales and rhonchi bilateral decrease breath sound   CV: Regular rate. Regular rhythm. No murmur or rub. No edema. GI: Non-tender. Non-distended. No hernia. Skin: Warm and dry. No nodule on exposed extremities. Lymph: No cervical LAD. No supraclavicular LAD. M/S: No cyanosis. No joint deformity. No clubbing. Neuro: Awake. Alert. Moves all four extremities. Psych: Oriented x 3. No anxiety.            DATA reviewed by me:   CXR  CT                 LABS/IMAGING:    CBC:  Lab Results   Component Value Date    WBC 8.1 12/03/2022    HGB 14.5 12/03/2022    HCT 43.3 12/03/2022    MCV 95.0 12/03/2022     12/03/2022    LYMPHOPCT 8.9 12/03/2022    RBC 4.56 12/03/2022    MCH 31.7 12/03/2022    MCHC 33.4 12/03/2022    RDW 13.6 12/03/2022    NEUTOPHILPCT 86.5 12/03/2022    MONOPCT 4.5 12/03/2022    EOSPCT 2.4 11/01/2011    BASOPCT 0.1 12/03/2022    NEUTROABS 7.0 12/03/2022    LYMPHSABS 0.7 (L) 12/03/2022    MONOSABS 0.4 12/03/2022    EOSABS 0.0 12/03/2022    BASOSABS 0.0 12/03/2022       Recent Labs     12/03/22  0434 12/02/22  0544 12/01/22  0524   WBC 8.1 7.6 8.2   HGB 14.5 14.9 14.6   HCT 43.3 44.3 42.7   MCV 95.0 93.1 94.0    211 192         BMP:   Recent Labs     12/01/22  0524 12/02/22  0544 12/03/22  0434    135* 129*   K 4.4 5.2* 4.5    101 97*   CO2 22 22 22   BUN 26* 34* 40*   CREATININE 1.0 1.0 0.9         MG: No results found for: MG  Ca/Phos:   Lab Results   Component Value Date    CALCIUM 9.0 12/03/2022    PHOS 3.5 11/01/2011     LIVER PROFILE:   Recent Labs     11/30/22  1623   AST 40*   ALT 11   BILITOT 0.7   ALKPHOS 116         PT/INR: No results for input(s): PROTIME, INR in the last 72 hours. APTT: No results for input(s): APTT in the last 72 hours. Cardiac Enzymes:  Lab Results   Component Value Date    TROPONINI <0.01 11/30/2022       Assessment:       -Acute hypoxic respiratory failure   -COVID pneumonia   -Acute COPD exacerbation   -ILD ,seems [facility] will try to get records   -Possible CHF       Plan:      *-will keep IVF steroids high dose , COVID or flare of his ILD   *-solumedrol 40 mg IV q 6 .start wean t40 mg tid   *-consider bronch if not better,r/o oppurtunistic infection,since better will hold   *_ Already given actemra by primary   *-Check hepatitis panel   *- need records about his ILD and if he had diagnosis and what treatment he had   *-cover with Iv abx   *_ for up for infections   BD  Follow Pan culture  Will monitor  BIPAP if any idstress  DVT px       Thank you very much for allowing me to participate in the care of this pleasant patient , should you have any questions ,please do not hesitate to contact me      Eusebio Colvin MD,[facility]  Pulmonary&Critical Care Medicine    Tono Brady    NOTE: This report was transcribed using voice recognition software. Every effort was made to ensure accuracy; however, inadvertent computerized transcription errors may be present.

## 2022-12-03 NOTE — PROGRESS NOTES
Pt currently on 4L of oxygen. States he is feeling better but still has some intermittent sharp pain when coughing. Urinal at bedside. No needs at this time.

## 2022-12-03 NOTE — FLOWSHEET NOTE
AM Vitals and assessment complete. Morning insulin administered with all other AM meds. Patient resting in bed and plans to ambulate for a BM. States he goes every few days and will probably go today.         12/03/22 0830   Vital Signs   Temp 97.6 °F (36.4 °C)   Temp Source Oral   Heart Rate 61   Heart Rate Source Monitor   Resp 20   /80   MAP (Calculated) 97   BP Location Right upper arm   BP Method Automatic   Patient Position Semi fowlers   Level of Consciousness 0   MEWS Score 1   Pain Assessment   Pain Assessment None - Denies Pain   RASS   Jarquin Agitation Sedation Scale (RASS) 0   Oxygen Therapy   SpO2 94 %   Pulse Oximetry Type Continuous   Pulse Oximeter Device Mode Intermittent   Pulse Oximeter Device Location Finger   O2 Flow Rate (L/min) 4 L/min

## 2022-12-04 LAB
ANION GAP SERPL CALCULATED.3IONS-SCNC: 10 MMOL/L (ref 3–16)
BASOPHILS ABSOLUTE: 0 K/UL (ref 0–0.2)
BASOPHILS RELATIVE PERCENT: 0 %
BLOOD CULTURE, ROUTINE: NORMAL
BUN BLDV-MCNC: 39 MG/DL (ref 7–20)
CALCIUM SERPL-MCNC: 9.5 MG/DL (ref 8.3–10.6)
CHLORIDE BLD-SCNC: 101 MMOL/L (ref 99–110)
CO2: 24 MMOL/L (ref 21–32)
CREAT SERPL-MCNC: 0.9 MG/DL (ref 0.8–1.3)
CULTURE, BLOOD 2: NORMAL
EOSINOPHILS ABSOLUTE: 0 K/UL (ref 0–0.6)
EOSINOPHILS RELATIVE PERCENT: 0 %
GFR SERPL CREATININE-BSD FRML MDRD: >60 ML/MIN/{1.73_M2}
GLUCOSE BLD-MCNC: 226 MG/DL (ref 70–99)
GLUCOSE BLD-MCNC: 267 MG/DL (ref 70–99)
GLUCOSE BLD-MCNC: 270 MG/DL (ref 70–99)
GLUCOSE BLD-MCNC: 273 MG/DL (ref 70–99)
GLUCOSE BLD-MCNC: 277 MG/DL (ref 70–99)
HCT VFR BLD CALC: 43.1 % (ref 40.5–52.5)
HEMOGLOBIN: 14.5 G/DL (ref 13.5–17.5)
LYMPHOCYTES ABSOLUTE: 0.7 K/UL (ref 1–5.1)
LYMPHOCYTES RELATIVE PERCENT: 8.8 %
MCH RBC QN AUTO: 31.6 PG (ref 26–34)
MCHC RBC AUTO-ENTMCNC: 33.6 G/DL (ref 31–36)
MCV RBC AUTO: 94 FL (ref 80–100)
MONOCYTES ABSOLUTE: 0.3 K/UL (ref 0–1.3)
MONOCYTES RELATIVE PERCENT: 4.3 %
NEUTROPHILS ABSOLUTE: 6.9 K/UL (ref 1.7–7.7)
NEUTROPHILS RELATIVE PERCENT: 86.9 %
PDW BLD-RTO: 13.5 % (ref 12.4–15.4)
PERFORMED ON: ABNORMAL
PLATELET # BLD: 218 K/UL (ref 135–450)
PMV BLD AUTO: 7.4 FL (ref 5–10.5)
POTASSIUM REFLEX MAGNESIUM: 4.9 MMOL/L (ref 3.5–5.1)
RBC # BLD: 4.59 M/UL (ref 4.2–5.9)
SODIUM BLD-SCNC: 135 MMOL/L (ref 136–145)
WBC # BLD: 8 K/UL (ref 4–11)

## 2022-12-04 PROCEDURE — 94761 N-INVAS EAR/PLS OXIMETRY MLT: CPT

## 2022-12-04 PROCEDURE — 2060000000 HC ICU INTERMEDIATE R&B

## 2022-12-04 PROCEDURE — 6360000002 HC RX W HCPCS: Performed by: HOSPITALIST

## 2022-12-04 PROCEDURE — 6360000002 HC RX W HCPCS: Performed by: INTERNAL MEDICINE

## 2022-12-04 PROCEDURE — 36415 COLL VENOUS BLD VENIPUNCTURE: CPT

## 2022-12-04 PROCEDURE — 2700000000 HC OXYGEN THERAPY PER DAY

## 2022-12-04 PROCEDURE — 2580000003 HC RX 258: Performed by: HOSPITALIST

## 2022-12-04 PROCEDURE — 6370000000 HC RX 637 (ALT 250 FOR IP): Performed by: HOSPITALIST

## 2022-12-04 PROCEDURE — 85025 COMPLETE CBC W/AUTO DIFF WBC: CPT

## 2022-12-04 PROCEDURE — 2580000003 HC RX 258: Performed by: INTERNAL MEDICINE

## 2022-12-04 PROCEDURE — 80048 BASIC METABOLIC PNL TOTAL CA: CPT

## 2022-12-04 PROCEDURE — 99233 SBSQ HOSP IP/OBS HIGH 50: CPT | Performed by: INTERNAL MEDICINE

## 2022-12-04 RX ADMIN — SODIUM CHLORIDE, PRESERVATIVE FREE 10 ML: 5 INJECTION INTRAVENOUS at 20:27

## 2022-12-04 RX ADMIN — ISOSORBIDE MONONITRATE 60 MG: 60 TABLET ORAL at 10:50

## 2022-12-04 RX ADMIN — INSULIN LISPRO 2 UNITS: 100 INJECTION, SOLUTION INTRAVENOUS; SUBCUTANEOUS at 13:25

## 2022-12-04 RX ADMIN — ATENOLOL 100 MG: 50 TABLET ORAL at 10:50

## 2022-12-04 RX ADMIN — INSULIN LISPRO 2 UNITS: 100 INJECTION, SOLUTION INTRAVENOUS; SUBCUTANEOUS at 10:52

## 2022-12-04 RX ADMIN — METHYLPREDNISOLONE SODIUM SUCCINATE 40 MG: 40 INJECTION, POWDER, FOR SOLUTION INTRAMUSCULAR; INTRAVENOUS at 10:51

## 2022-12-04 RX ADMIN — ENOXAPARIN SODIUM 30 MG: 100 INJECTION SUBCUTANEOUS at 20:26

## 2022-12-04 RX ADMIN — INSULIN LISPRO 1 UNITS: 100 INJECTION, SOLUTION INTRAVENOUS; SUBCUTANEOUS at 16:15

## 2022-12-04 RX ADMIN — ENOXAPARIN SODIUM 30 MG: 100 INJECTION SUBCUTANEOUS at 10:50

## 2022-12-04 RX ADMIN — METHYLPREDNISOLONE SODIUM SUCCINATE 40 MG: 40 INJECTION, POWDER, FOR SOLUTION INTRAMUSCULAR; INTRAVENOUS at 01:51

## 2022-12-04 RX ADMIN — ATORVASTATIN CALCIUM 80 MG: 40 TABLET, FILM COATED ORAL at 10:50

## 2022-12-04 RX ADMIN — SODIUM CHLORIDE, PRESERVATIVE FREE 10 ML: 5 INJECTION INTRAVENOUS at 10:51

## 2022-12-04 RX ADMIN — METHYLPREDNISOLONE SODIUM SUCCINATE 40 MG: 40 INJECTION, POWDER, FOR SOLUTION INTRAMUSCULAR; INTRAVENOUS at 16:12

## 2022-12-04 RX ADMIN — CEFTRIAXONE SODIUM 1000 MG: 1 INJECTION, POWDER, FOR SOLUTION INTRAMUSCULAR; INTRAVENOUS at 16:11

## 2022-12-04 RX ADMIN — ASPIRIN 81 MG: 81 TABLET, COATED ORAL at 10:50

## 2022-12-04 NOTE — PLAN OF CARE
Problem: Discharge Planning  Goal: Discharge to home or other facility with appropriate resources  Outcome: Progressing     Problem: ABCDS Injury Assessment  Goal: Absence of physical injury  Outcome: Progressing     Problem: ABCDS Injury Assessment  Goal: Absence of physical injury  Outcome: Progressing     Problem: Chronic Conditions and Co-morbidities  Goal: Patient's chronic conditions and co-morbidity symptoms are monitored and maintained or improved  Outcome: Progressing     Problem: Safety - Adult  Goal: Free from fall injury  Outcome: Progressing

## 2022-12-04 NOTE — PROGRESS NOTES
Bedside report and transfer of care given to [facility], RN. Pt currently resting in bed with the call light within reach. Pt denies any other care needs at this time. Pt stable at this time.

## 2022-12-04 NOTE — PROGRESS NOTES
Patient walked to restroom with o2 @ 4l. Patient returned to be with assistance and 02 was 78%     Patient o2 returned to 94% within 2 minutes while in bed. Patient meds given and O2 titrated down to 3L. Patient continues to sat at 94% on 3L. Will continue to monitor oxygen demand and response to movement.

## 2022-12-04 NOTE — PROGRESS NOTES
IM Progress Note    Admit Date:  11/30/2022    -pmhx of pulmonary fibrosis, on 2 L O2 chronically, DM, CAD, HTN, HLD, BPH  -presented for shortness of breath, cough, hemoptysis   -placed on 10 L high flow NC     Subjective:  SOB continues to slowly improve. He has been weaned down to 2 L of O2 at rest.   (Baseline home O2 is 2 L for ILD and pulmonary fibrosis)  Still desaturating with activity, O2 sat down to the 80s on 3 L with activity today. Cough and shortness of breath is significantly improved. Overall feels well    Objective:   Patient Vitals for the past 4 hrs:   BP Temp Temp src Pulse Resp SpO2   12/04/22 1545 (!) 145/89 98.7 °F (37.1 °C) Oral 61 18 93 %            Intake/Output Summary (Last 24 hours) at 12/4/2022 1833  Last data filed at 12/4/2022 0146  Gross per 24 hour   Intake 120 ml   Output 1000 ml   Net -880 ml         Physical Exam:    Gen: No distress. Alert. Pleasant male   Eyes: PERRL. No sclera icterus. No conjunctival injection. Neck: No JVD. Trachea midline. Resp: No accessory muscle use. No crackles. No wheezes. ++Diminished breath sounds with some rhonchi   CV: Regular rate. Regular rhythm. No murmur. No rub. No edema. Peripheral Pulses: +2 palpable, equal bilaterally   GI: Non-tender. Non-distended. Normal bowel sounds. Skin: Warm and dry. No nodule on exposed extremities. No rash on exposed extremities. M/S: No cyanosis. No joint deformity. No clubbing. Neuro: Awake. Grossly nonfocal    Psych: Oriented x 3. No anxiety or agitation.          Medications:  methylPREDNISolone, 40 mg, Q8H  cefTRIAXone (ROCEPHIN) IV, 1,000 mg, Q24H  aspirin, 81 mg, Daily  atenolol, 100 mg, Daily  isosorbide mononitrate, 60 mg, Daily  atorvastatin, 80 mg, Daily  sodium chloride flush, 5-40 mL, 2 times per day  enoxaparin, 30 mg, BID  insulin lispro, 0-4 Units, TID WC  insulin lispro, 0-4 Units, Nightly    PRN Medications:  HYDROcodone 5 mg - acetaminophen, 1 tablet, Q4H PRN   Or  HYDROcodone 5 mg - acetaminophen, 2 tablet, Q4H PRN  perflutren lipid microspheres, 1.5 mL, ONCE PRN  sodium chloride flush, 5-40 mL, PRN  sodium chloride, , PRN  ondansetron, 4 mg, Q8H PRN   Or  ondansetron, 4 mg, Q6H PRN  polyethylene glycol, 17 g, Daily PRN  acetaminophen, 650 mg, Q6H PRN   Or  acetaminophen, 650 mg, Q6H PRN  glucose, 4 tablet, PRN  dextrose bolus, 125 mL, PRN   Or  dextrose bolus, 250 mL, PRN  glucagon (rDNA), 1 mg, PRN  dextrose, , Continuous PRN        Data:  CBC:   Recent Labs     12/02/22  0544 12/03/22  0434 12/04/22 0425   WBC 7.6 8.1 8.0   HGB 14.9 14.5 14.5   HCT 44.3 43.3 43.1   MCV 93.1 95.0 94.0    225 218       BMP:   Recent Labs     12/02/22  0544 12/03/22  0434 12/04/22 0425   * 129* 135*   K 5.2* 4.5 4.9    97* 101   CO2 22 22 24   BUN 34* 40* 39*   CREATININE 1.0 0.9 0.9       LIVER PROFILE:   No results for input(s): AST, ALT, LIPASE, BILIDIR, BILITOT, ALKPHOS in the last 72 hours. Invalid input(s): AMYLASE,  ALB        CULTURES  COVID detected  Influenza A and B not detected  Blood cultures negative     RADIOLOGY  CT CHEST PULMONARY EMBOLISM W CONTRAST   Final Result   Images are significantly degraded by respiratory and cardiac motion. No evidence of pulmonary embolism. Extensive amount of ground-glass densities throughout both lungs near   completely opacifying lung parenchyma bilaterally concerning for multifocal   pneumonia. Fibrotic changes throughout both lungs, predominantly affecting the lung   periphery. Findings were present on prior CT examination. Mild cardiomegaly. RECOMMENDATIONS:   Unavailable         XR CHEST PORTABLE   Final Result   1. Findings likely reflect acute pneumonia bilateral mid and lower lungs on a   background of chronic pulmonary disease. 2. Calcific atherosclerosis aorta. 3. Cardiomegaly. ECHO   Conclusions      Summary   LV systolic function is normal with ejection fraction estimated 55%.    No obvious segmental wall motion abnormalities. There is mild concentric left ventricular hypertrophy. Grade II diastolic dysfunction with elevated left ventricular filling   pressure. Mild mitral annular calcification. Aortic valve appears sclerotic but opens adequately. Mild-moderate mitral regurgitation. Mild tricuspid regurgitation. Systolic pulmonary artery pressure (SPAP) is estimated at 46 mmHg c/w mild   pulm HTN (Right atrial pressure of 8 mmHg). Pt COVID positive. Assessment/Plan:      #Acute on chronic hypoxic respiratory failure   #Pulmonary fibrosis   #COVID pneumonia   -has pulmonologist reportedly at [facility] but is not on any inhalers or treatment reportedly   -on 2 L O2 baseline, requiring 10 high flow nasal cannula->  up to 15Lon presentation. Continued on supplemental oxygen, wean as tolerated. O2 down to 4 L-> 2 L at rest.  Still desaturating with activity  -WBC resolved today, LA WNLs, procalc neg   -CT scan reviewed- as above ; shows groundglass opacities, possible pneumonia. Patient has underlying pulmonary fibrosis  - IV solumedrol   - s/p tocilizumab   -on IV rocephin per pulm for possible superimposed gram-positive bacterial pneumonia  -blood cultures neg  -strep and legionella neg   -pulmonology consulted     Oxygen saturations are improving now; currently requiring 2 L at rest, still desaturating to 80s on 3 L with ambulation. Recheck O2 sats with ambulation tomorrow. Discussed with patient, will likely need a new O2 order with increased O2 requirement with ambulation on discharge. Continue antibiotics Rocephin and steroids. Patient will need prescription for metered-dose inhalers on discharge    #This patient has sepsis due to Covid which was present on admission.     #Elevated d dimer  -CTPA neg for PE     #Hyperkalemia   -resolved     #Cardiomegaly   -no echo in EMR. echo done G2 DD, normal EF    #DM type 2   -holding oral regimen   -SSI  -continue to monitor BG #CAD   -on ASA, statin, imdur, and BB     #HTN   -atenolol       #HLD   -on statin     # Hyponatremia   sodium is down from 135-1 29. Will monitor. Sodium rechecked and stable at 135 today        DVT Prophylaxis: Lovenox   Diet: ADULT DIET; Regular; 4 carb choices (60 gm/meal)  Code Status: Full Code      Continue to wean O2 as tolerated.    Increase activity as tolerated  monitor sodium levels  Likely discharge plan for home with home health care in       Ej Anthony MD  12/04/22  6:33 PM

## 2022-12-04 NOTE — PROGRESS NOTES
[facility]                                 Pulmonary Consult /Progress Note :                                                                  CHIEF COMPLAINT: SOB       COVID hypoxic, pulmonary fibrosis  HPI:   Doing better and feeling a lot better   SOB has improved   On 3L   Base line 2 L  No chest pain  No hemoptysis     Objective:   PHYSICAL EXAM:    Blood pressure 119/71, pulse 56, temperature 97.3 °F (36.3 °C), temperature source Oral, resp. rate 18, height 5' 9\" (1.753 m), weight 178 lb (80.7 kg), SpO2 94 %.' on RA  Gen: No distress. Eyes: PERRL. No sclera icterus. No conjunctival injection. ENT: No discharge. Pharynx clear. Neck: Trachea midline. No obvious mass. Resp: rales and rhonchi bilateral decrease breath sound   CV: Regular rate. Regular rhythm. No murmur or rub. No edema. GI: Non-tender. Non-distended. No hernia. Skin: Warm and dry. No nodule on exposed extremities. Lymph: No cervical LAD. No supraclavicular LAD. M/S: No cyanosis. No joint deformity. No clubbing. Neuro: Awake. Alert. Moves all four extremities. Psych: Oriented x 3. No anxiety.            DATA reviewed by me:   CXR  CT                 LABS/IMAGING:    CBC:  Lab Results   Component Value Date    WBC 8.0 12/04/2022    HGB 14.5 12/04/2022    HCT 43.1 12/04/2022    MCV 94.0 12/04/2022     12/04/2022    LYMPHOPCT 8.8 12/04/2022    RBC 4.59 12/04/2022    MCH 31.6 12/04/2022    MCHC 33.6 12/04/2022    RDW 13.5 12/04/2022    NEUTOPHILPCT 86.9 12/04/2022    MONOPCT 4.3 12/04/2022    EOSPCT 2.4 11/01/2011    BASOPCT 0.0 12/04/2022    NEUTROABS 6.9 12/04/2022    LYMPHSABS 0.7 (L) 12/04/2022    MONOSABS 0.3 12/04/2022    EOSABS 0.0 12/04/2022    BASOSABS 0.0 12/04/2022       Recent Labs     12/04/22  0425 12/03/22  0434 12/02/22  0544   WBC 8.0 8.1 7.6   HGB 14.5 14.5 14.9   HCT 43.1 43.3 44.3   MCV 94.0 95.0 93.1    225 211         BMP:   Recent Labs     12/02/22  0544 12/03/22  0434 12/04/22  0425   * 129* 135*   K 5.2* 4.5 4.9    97* 101   CO2 22 22 24   BUN 34* 40* 39*   CREATININE 1.0 0.9 0.9         MG: No results found for: MG  Ca/Phos:   Lab Results   Component Value Date    CALCIUM 9.5 12/04/2022    PHOS 3.5 11/01/2011     LIVER PROFILE:   No results for input(s): AST, ALT, LIPASE, BILIDIR, BILITOT, ALKPHOS in the last 72 hours. Invalid input(s): AMYLASE,  ALB      PT/INR: No results for input(s): PROTIME, INR in the last 72 hours. APTT: No results for input(s): APTT in the last 72 hours. Cardiac Enzymes:  Lab Results   Component Value Date    TROPONINI <0.01 11/30/2022       Assessment:       -Acute hypoxic respiratory failure   -COVID pneumonia   -Acute COPD exacerbation   -ILD ,seems UIP-IPF will try to get records   -Possible CHF       Plan:      *-will keep IVF steroids high dose , COVID or flare of his ILD   *-solumedrol 40 mg IV q 12 and wean PO soon   *-consider bronch if not better,r/o oppurtunistic infection,since better will hold   *_ Already given actemra by primary   *-Check hepatitis panel   *- need records about his ILD and if he had diagnosis and what treatment he had   *-cover with Iv abx   *_ for up for infections   BD  Follow Pan culture  Will monitor  BIPAP if any idstress  DVT px       Thank you very much for allowing me to participate in the care of this pleasant patient , should you have any questions ,please do not hesitate to contact me      Aldo Colvin MD,[facility]  P[facility]&Critical Care Medicine    Ivis Williamson    NOTE: This report was transcribed using voice recognition software. Every effort was made to ensure accuracy; however, inadvertent computerized transcription errors may be present.

## 2022-12-04 NOTE — PROGRESS NOTES
Patient at 93% on 3L but drops to low 80s with minimal exertion. Patient states he is feeling better and would like to go home.

## 2022-12-04 NOTE — PROGRESS NOTES
PM assessment completed. Scheduled medications given per MAR. VSS 3 liter NC, A/O x4. Patient denies any needs at this time. Call light in reach, will monitor.

## 2022-12-05 PROBLEM — J84.9 ILD (INTERSTITIAL LUNG DISEASE) (HCC): Status: ACTIVE | Noted: 2022-12-05

## 2022-12-05 LAB
GLUCOSE BLD-MCNC: 276 MG/DL (ref 70–99)
GLUCOSE BLD-MCNC: 295 MG/DL (ref 70–99)
GLUCOSE BLD-MCNC: 348 MG/DL (ref 70–99)
GLUCOSE BLD-MCNC: 350 MG/DL (ref 70–99)
PERFORMED ON: ABNORMAL

## 2022-12-05 PROCEDURE — 94640 AIRWAY INHALATION TREATMENT: CPT

## 2022-12-05 PROCEDURE — 6360000002 HC RX W HCPCS: Performed by: HOSPITALIST

## 2022-12-05 PROCEDURE — 6370000000 HC RX 637 (ALT 250 FOR IP): Performed by: INTERNAL MEDICINE

## 2022-12-05 PROCEDURE — 6370000000 HC RX 637 (ALT 250 FOR IP)

## 2022-12-05 PROCEDURE — 99233 SBSQ HOSP IP/OBS HIGH 50: CPT | Performed by: INTERNAL MEDICINE

## 2022-12-05 PROCEDURE — 94761 N-INVAS EAR/PLS OXIMETRY MLT: CPT

## 2022-12-05 PROCEDURE — 6370000000 HC RX 637 (ALT 250 FOR IP): Performed by: HOSPITALIST

## 2022-12-05 PROCEDURE — 2060000000 HC ICU INTERMEDIATE R&B

## 2022-12-05 PROCEDURE — 2580000003 HC RX 258: Performed by: INTERNAL MEDICINE

## 2022-12-05 PROCEDURE — 2580000003 HC RX 258: Performed by: HOSPITALIST

## 2022-12-05 PROCEDURE — 6360000002 HC RX W HCPCS: Performed by: INTERNAL MEDICINE

## 2022-12-05 PROCEDURE — 2700000000 HC OXYGEN THERAPY PER DAY

## 2022-12-05 RX ORDER — ALBUTEROL SULFATE 90 UG/1
2 AEROSOL, METERED RESPIRATORY (INHALATION) EVERY 6 HOURS PRN
Status: DISCONTINUED | OUTPATIENT
Start: 2022-12-05 | End: 2022-12-06 | Stop reason: HOSPADM

## 2022-12-05 RX ORDER — ALBUTEROL SULFATE 90 UG/1
2 AEROSOL, METERED RESPIRATORY (INHALATION) EVERY 4 HOURS PRN
Status: DISCONTINUED | OUTPATIENT
Start: 2022-12-05 | End: 2022-12-06 | Stop reason: HOSPADM

## 2022-12-05 RX ORDER — ALBUTEROL SULFATE 2.5 MG/3ML
2.5 SOLUTION RESPIRATORY (INHALATION)
Status: DISCONTINUED | OUTPATIENT
Start: 2022-12-05 | End: 2022-12-06 | Stop reason: HOSPADM

## 2022-12-05 RX ORDER — ALBUTEROL SULFATE 90 UG/1
2 AEROSOL, METERED RESPIRATORY (INHALATION) 2 TIMES DAILY
Status: DISCONTINUED | OUTPATIENT
Start: 2022-12-05 | End: 2022-12-05

## 2022-12-05 RX ADMIN — INSULIN LISPRO 2 UNITS: 100 INJECTION, SOLUTION INTRAVENOUS; SUBCUTANEOUS at 17:07

## 2022-12-05 RX ADMIN — METHYLPREDNISOLONE SODIUM SUCCINATE 40 MG: 40 INJECTION, POWDER, FOR SOLUTION INTRAMUSCULAR; INTRAVENOUS at 09:37

## 2022-12-05 RX ADMIN — METHYLPREDNISOLONE SODIUM SUCCINATE 40 MG: 40 INJECTION, POWDER, FOR SOLUTION INTRAMUSCULAR; INTRAVENOUS at 02:59

## 2022-12-05 RX ADMIN — ENOXAPARIN SODIUM 30 MG: 100 INJECTION SUBCUTANEOUS at 19:59

## 2022-12-05 RX ADMIN — ASPIRIN 81 MG: 81 TABLET, COATED ORAL at 09:41

## 2022-12-05 RX ADMIN — Medication 2 PUFF: at 19:10

## 2022-12-05 RX ADMIN — SODIUM CHLORIDE, PRESERVATIVE FREE 10 ML: 5 INJECTION INTRAVENOUS at 09:41

## 2022-12-05 RX ADMIN — ATORVASTATIN CALCIUM 80 MG: 40 TABLET, FILM COATED ORAL at 09:36

## 2022-12-05 RX ADMIN — INSULIN LISPRO 4 UNITS: 100 INJECTION, SOLUTION INTRAVENOUS; SUBCUTANEOUS at 11:56

## 2022-12-05 RX ADMIN — ATENOLOL 100 MG: 50 TABLET ORAL at 09:36

## 2022-12-05 RX ADMIN — INSULIN LISPRO 4 UNITS: 100 INJECTION, SOLUTION INTRAVENOUS; SUBCUTANEOUS at 20:05

## 2022-12-05 RX ADMIN — METHYLPREDNISOLONE SODIUM SUCCINATE 40 MG: 40 INJECTION, POWDER, FOR SOLUTION INTRAMUSCULAR; INTRAVENOUS at 17:06

## 2022-12-05 RX ADMIN — ENOXAPARIN SODIUM 30 MG: 100 INJECTION SUBCUTANEOUS at 09:37

## 2022-12-05 RX ADMIN — CEFTRIAXONE SODIUM 1000 MG: 1 INJECTION, POWDER, FOR SOLUTION INTRAMUSCULAR; INTRAVENOUS at 14:47

## 2022-12-05 RX ADMIN — ISOSORBIDE MONONITRATE 60 MG: 60 TABLET ORAL at 09:37

## 2022-12-05 RX ADMIN — Medication 2 PUFF: at 22:56

## 2022-12-05 RX ADMIN — INSULIN LISPRO 2 UNITS: 100 INJECTION, SOLUTION INTRAVENOUS; SUBCUTANEOUS at 09:35

## 2022-12-05 NOTE — PROGRESS NOTES
Patient resting in bed, AM assessment completed. Lungs decreased. BS+. INT x 2 intact. O2 at 3L per NC, 93%. No acute distress noted. Call light in reach. Will continue to monitor.

## 2022-12-05 NOTE — PROGRESS NOTES
RT Inhaler-Nebulizer Bronchodilator Protocol Note    There is a bronchodilator order in the chart from a provider indicating to follow the RT Bronchodilator Protocol and there is an Initiate RT Inhaler-Nebulizer Bronchodilator Protocol order as well (see protocol at bottom of note). CXR Findings:  No results found. The findings from the last RT Protocol Assessment were as follows:   History Pulmonary Disease: Chronic pulmonary disease  Respiratory Pattern: Dyspnea on exertion or RR 21-25 bpm  Breath Sounds: Slightly diminished and/or crackles  Cough: Strong, spontaneous, non-productive  Indication for Bronchodilator Therapy: Peak Flow less than 80% predicted for known asthma pts  Bronchodilator Assessment Score: 6    Aerosolized bronchodilator medication orders have been revised according to the RT Inhaler-Nebulizer Bronchodilator Protocol below. Respiratory Therapist to perform RT Therapy Protocol Assessment initially then follow the protocol. Repeat RT Therapy Protocol Assessment PRN for score 0-3 or on second treatment, BID, and PRN for scores above 3. No Indications - adjust the frequency to every 6 hours PRN wheezing or bronchospasm, if no treatments needed after 48 hours then discontinue using Per Protocol order mode. If indication present, adjust the RT bronchodilator orders based on the Bronchodilator Assessment Score as indicated below. Use Inhaler orders unless patient has one or more of the following: on home nebulizer, not able to hold breath for 10 seconds, is not alert and oriented, cannot activate and use MDI correctly, or respiratory rate 25 breaths per minute or more, then use the equivalent nebulizer order(s) with same Frequency and PRN reasons based on the score. If a patient is on this medication at home then do not decrease Frequency below that used at home.     0-3 - enter or revise RT bronchodilator order(s) to equivalent RT Bronchodilator order with Frequency of every 4 hours PRN for wheezing or increased work of breathing using Per Protocol order mode. 4-6 - enter or revise RT Bronchodilator order(s) to two equivalent RT bronchodilator orders with one order with BID Frequency and one order with Frequency of every 4 hours PRN wheezing or increased work of breathing using Per Protocol order mode. 7-10 - enter or revise RT Bronchodilator order(s) to two equivalent RT bronchodilator orders with one order with TID Frequency and one order with Frequency of every 4 hours PRN wheezing or increased work of breathing using Per Protocol order mode. 11-13 - enter or revise RT Bronchodilator order(s) to one equivalent RT bronchodilator order with QID Frequency and an Albuterol order with Frequency of every 4 hours PRN wheezing or increased work of breathing using Per Protocol order mode. Greater than 13 - enter or revise RT Bronchodilator order(s) to one equivalent RT bronchodilator order with every 4 hours Frequency and an Albuterol order with Frequency of every 2 hours PRN wheezing or increased work of breathing using Per Protocol order mode. RT to enter RT Home Evaluation for COPD & MDI Assessment order using Per Protocol order mode.     Electronically signed by Tee De Dios RCP on 12/5/2022 at 5:53 PM

## 2022-12-05 NOTE — PROGRESS NOTES
IM Progress Note    Admit Date:  11/30/2022    -pmhx of pulmonary fibrosis, on 2 L O2 chronically, DM, CAD, HTN, HLD, BPH  -presented for shortness of breath, cough, hemoptysis   COVID-positive  -placed on 10 L high flow NC     Subjective:  12/5  Patient feels better and wants to go home, cough and shortness of breath have improved. However still hypoxic with ambulation. His O2 sats are stable on 3 L at rest.  He was desaturating into the 80s even on 6 L with ambulation. Requires O2 up to 8 L to maintain sats of 88% with ambulation. Pulmonology following      Objective:   Patient Vitals for the past 4 hrs:   BP Temp Temp src Pulse Resp SpO2   12/05/22 0920 125/65 98 °F (36.7 °C) Axillary 70 18 93 %            Intake/Output Summary (Last 24 hours) at 12/5/2022 1217  Last data filed at 12/5/2022 1120  Gross per 24 hour   Intake 360 ml   Output 2150 ml   Net -1790 ml         Physical Exam:    Gen: No distress. Alert. Pleasant male   Eyes: PERRL. No sclera icterus. No conjunctival injection. Neck: No JVD. Trachea midline. Resp: No accessory muscle use. No crackles. No wheezes. ++Diminished breath sounds with some rhonchi   CV: Regular rate. Regular rhythm. No murmur. No rub. No edema. Peripheral Pulses: +2 palpable, equal bilaterally   GI: Non-tender. Non-distended. Normal bowel sounds. Skin: Warm and dry. No nodule on exposed extremities. No rash on exposed extremities. M/S: No cyanosis. No joint deformity. No clubbing. Neuro: Awake. Grossly nonfocal    Psych: Oriented x 3. No anxiety or agitation.          Medications:  methylPREDNISolone, 40 mg, Q8H  cefTRIAXone (ROCEPHIN) IV, 1,000 mg, Q24H  aspirin, 81 mg, Daily  atenolol, 100 mg, Daily  isosorbide mononitrate, 60 mg, Daily  atorvastatin, 80 mg, Daily  sodium chloride flush, 5-40 mL, 2 times per day  enoxaparin, 30 mg, BID  insulin lispro, 0-4 Units, TID WC  insulin lispro, 0-4 Units, Nightly    PRN Medications:  HYDROcodone 5 mg - acetaminophen, 1 tablet, Q4H PRN   Or  HYDROcodone 5 mg - acetaminophen, 2 tablet, Q4H PRN  perflutren lipid microspheres, 1.5 mL, ONCE PRN  sodium chloride flush, 5-40 mL, PRN  sodium chloride, , PRN  ondansetron, 4 mg, Q8H PRN   Or  ondansetron, 4 mg, Q6H PRN  polyethylene glycol, 17 g, Daily PRN  acetaminophen, 650 mg, Q6H PRN   Or  acetaminophen, 650 mg, Q6H PRN  glucose, 4 tablet, PRN  dextrose bolus, 125 mL, PRN   Or  dextrose bolus, 250 mL, PRN  glucagon (rDNA), 1 mg, PRN  dextrose, , Continuous PRN        Data:  CBC:   Recent Labs     12/03/22  0434 12/04/22  0425   WBC 8.1 8.0   HGB 14.5 14.5   HCT 43.3 43.1   MCV 95.0 94.0    218       BMP:   Recent Labs     12/03/22  0434 12/04/22  0425   * 135*   K 4.5 4.9   CL 97* 101   CO2 22 24   BUN 40* 39*   CREATININE 0.9 0.9       LIVER PROFILE:   No results for input(s): AST, ALT, LIPASE, BILIDIR, BILITOT, ALKPHOS in the last 72 hours. Invalid input(s): AMYLASE,  ALB        CULTURES  COVID detected  Influenza A and B not detected  Blood cultures negative     RADIOLOGY  CT CHEST PULMONARY EMBOLISM W CONTRAST   Final Result   Images are significantly degraded by respiratory and cardiac motion. No evidence of pulmonary embolism. Extensive amount of ground-glass densities throughout both lungs near   completely opacifying lung parenchyma bilaterally concerning for multifocal   pneumonia. Fibrotic changes throughout both lungs, predominantly affecting the lung   periphery. Findings were present on prior CT examination. Mild cardiomegaly. RECOMMENDATIONS:   Unavailable         XR CHEST PORTABLE   Final Result   1. Findings likely reflect acute pneumonia bilateral mid and lower lungs on a   background of chronic pulmonary disease. 2. Calcific atherosclerosis aorta. 3. Cardiomegaly. ECHO   Conclusions      Summary   LV systolic function is normal with ejection fraction estimated 55%.    No obvious segmental wall motion Hyponatremia  -Resolved. DVT Prophylaxis: Lovenox   Diet: ADULT DIET; Regular; 4 carb choices (60 gm/meal)  Code Status: Full Code      Continue to wean O2 as tolerated. Increase activity as tolerated    Discharge plan for home with home health care when O2 sats stable; he will need prescriptions for antibiotics , nebulizers /metered-dose inhalers and prednisone on discharge    -Patient clinically improved however having significant desaturations with ambulation. Hold off on discharge today. pulmonary to evaluate the patient today.        Ej Anthony MD  12/05/22  12:17 PM

## 2022-12-05 NOTE — CARE COORDINATION
INTERDISCIPLINARY PLAN OF CARE CONFERENCE    Date/Time: 12/5/2022 10:31 AM  Completed by: CHELA Watkins student Case Management      Patient Name:  Charissa Vinson  YOB: 1950  Admitting Diagnosis: History of pulmonary fibrosis [Z87.09]  Acute on chronic respiratory failure with hypoxia ([facility] [address] [J96.21]  Pneumonia due to COVID-19 virus [U07.1, J12.82]     Admit Date/Time:  11/30/2022  4:08 PM    Chart reviewed. Interdisciplinary team contacted or reviewed plan related to patient progress and discharge plans. Disciplines included Case Management, Nursing, and Dietitian. Current Status:ongoing  PT/OT recommendation for discharge plan of care: n/a    Expected D/C Disposition:  Home  Confirmed plan with patient and/or family Yes confirmed with: (name) pt  Met with:pt via telecom due to COVID 19 precautions. Discharge Plan Comments: Chart review completed. CM spoke w/ pt via telecom due to COVID-19 precautions. CM requested [facility] of [facility] as it seems pt will d/c with higher O2 requirement. Pt stated that it is [facility]. CM spoke with Kaushik Fagan at [facility] who confirmed that pt is active with 2-3L cont. Pt stated that he is ready to go and was told he would likely d/c today. CM will continue to follow for d/c needs.      Home O2 in place on admit: Yes

## 2022-12-05 NOTE — PROGRESS NOTES
Pulse oximetry assessment     98%% at rest on 3LPM  Patient down to 80% while ambulating on 6LPM , turned up to 8L and remained 88% with ambulation. Dr. Ya Haviland made aware.

## 2022-12-05 NOTE — PROGRESS NOTES
Hospitalist Progress Note    Patient: Ludivina Rod MRN: [id number]  CSN: [id number]    YOB: 1962  Age: 53 y.o. Sex: female    DOA: 3/20/2017 LOS:  LOS: 5 days          Chief Complaint:asthma          Assessment/Plan   Asthma exacerbation  Pneumonia. HA  Depression  HTN    Making progress. Continue current treatment protocol. All questions answered. Dc 24-48 hrs. Patient Active Problem List   Diagnosis Code    Screen for colon cancer Z12.11    Acute respiratory insufficiency (HCC) R06.89    Asthma exacerbation J45. 901    Tachycardia R00.0    HTN (hypertension) I10    Pneumonia J18.9    Hypokalemia E87.6    OPAL (acute kidney injury) (HCC) N17.9    Constipation K59.00    Hypoxia R09.02       Subjective:      Feeling better. Respirations improving. Hoping to go home in the next couple days. Review of systems:    Constitutional: denies fevers, chills, myalgias  Respiratory: denies SOB, cough  Cardiovascular: denies chest pain, palpitations  Gastrointestinal: denies nausea, vomiting, diarrhea      Vital signs/Intake and Output:  Visit Vitals    /84 (BP 1 Location: Left arm, BP Patient Position: At rest)    Pulse 77    Temp 98.1 °F (36.7 °C)    Resp 19    Ht 5' 2\" (1.575 m)    Wt 78.3 kg (172 lb 9.6 oz)    SpO2 100%    BMI 31.57 kg/m2     Current Shift:     Last three shifts:  03/23 1901 - 03/25 0700  In: -   Out: 500 [Urine:500]    Exam:    General: Well developed, alert, NAD, OX3  Head/Neck: NCAT, supple, No masses, No lymphadenopathy  CVS:Regular rate and rhythm, no M/R/G, S1/S2 heard, no thrill  Lungs:Improved exam.  Decreased wheezing   Extremities: No                 Labs: Results:       Chemistry Recent Labs      03/24/17   0248   CREA  0.97      CBC w/Diff No results for input(s): WBC, RBC, HGB, HCT, PLT, GRANS, LYMPH, EOS, HGBEXT, HCTEXT, PLTEXT in the last 72 hours. Cardiac Enzymes No results for input(s): CPK, CKND1, YAMILKA in the last 72 hours.     No Pt resting in bed this evening watching TV. Shift assessment complete and all meds given per MAR. , no coverage needed. Pt denies pain. [place] ([facility]) box lunch provided with beverage. Bed in lowest position, call light in hand. All patient needs addressed. lab exists for component: CKRMB, TROIP   Coagulation No results for input(s): PTP, INR, APTT in the last 72 hours. No lab exists for component: INREXT    Lipid Panel No results found for: CHOL, CHOLPOCT, CHOLX, CHLST, CHOLV, [id number], HDL, LDL, NLDLCT, DLDL, LDLC, DLDLP, [id number], VLDLC, VLDL, TGL, TGLX, TRIGL, [id number], TRIGP, TGLPOCT, [id number], CHHD, CHHDX   BNP No results for input(s): BNPP in the last 72 hours. Liver Enzymes No results for input(s): TP, ALB, TBIL, AP, SGOT, GPT in the last 72 hours.     No lab exists for component: DBIL   Thyroid Studies No results found for: T4, T3U, TSH, TSHEXT     Procedures/imaging: see electronic medical records for all procedures/Xrays and details which were not copied into this note but were reviewed prior to creation of Chavez Franco MD

## 2022-12-05 NOTE — PROGRESS NOTES
PULMONARY PROGRESS NOTE  CC: Shortness of breath, hemoptysis, ILD & COVID    Subjective:   Breathing feels better. Required 8 L on walk test today; however, reports he significantly desaturates to 70s-80s% at home on 2 L at baseline. Requesting albuterol treatments, which were helpful in the ER. IV line: Peripheral      PHYSICAL EXAM:   /65   Pulse 70   Temp 98 °F (36.7 °C) (Axillary)   Resp 18   Ht 5' 9\" (1.753 m)   Wt 173 lb (78.5 kg)   SpO2 93%   BMI 25.55 kg/m² ' on 3 L  General: ill appearing. Eyes: PERRL. No sclera icterus. No conjunctival injection. ENT: No discharge. Pharynx clear. Neck: Trachea midline. Normal thyroid. Resp: + accessory muscle use. + crackles. No wheezing. No rhonchi. No dullness on percussion. CV: Regular rate. Regular rhythm. No mumur or rub. No edema. Peripheral pulses are 2+. Capillary refill is less than 3 seconds. GI: Non-tender. Non-distended. No masses. No organomegaly. Normal bowel sounds. No hernia. Skin: Warm and dry. No nodule on exposed extremities. No rash on exposed extremities. Lymph: No cervical LAD. No supraclavicular LAD. M/S: No cyanosis. No joint deformity. No clubbing. Neuro: A&OX3. Patellar reflexes are symmetric. Psych: No agitation, no anxiety, affect is full.      Scheduled Meds:   methylPREDNISolone  40 mg IntraVENous Q8H    cefTRIAXone (ROCEPHIN) IV  1,000 mg IntraVENous Q24H    aspirin  81 mg Oral Daily    atenolol  100 mg Oral Daily    isosorbide mononitrate  60 mg Oral Daily    atorvastatin  80 mg Oral Daily    sodium chloride flush  5-40 mL IntraVENous 2 times per day    enoxaparin  30 mg SubCUTAneous BID    insulin lispro  0-4 Units SubCUTAneous TID WC    insulin lispro  0-4 Units SubCUTAneous Nightly     Continuous Infusions:   sodium chloride      dextrose       PRN Meds:  HYDROcodone 5 mg - acetaminophen **OR** HYDROcodone 5 mg - acetaminophen, perflutren lipid microspheres, sodium chloride flush, sodium chloride, ondansetron **OR** ondansetron, polyethylene glycol, acetaminophen **OR** acetaminophen, glucose, dextrose bolus **OR** dextrose bolus, glucagon (rDNA), dextrose    Labs:  CBC:   Recent Labs     12/03/22  0434 12/04/22  0425   WBC 8.1 8.0   HGB 14.5 14.5   HCT 43.3 43.1   MCV 95.0 94.0    218     BMP:   Recent Labs     12/03/22  0434 12/04/22  0425   * 135*   K 4.5 4.9   CL 97* 101   CO2 22 24   BUN 40* 39*   CREATININE 0.9 0.9     Micro:  11/30/2022 SARS-CoV-2 DETECTED  12/1/22 Urine legionella & strep pneumo Ags negative  Procalcitonin 0.08      Imaging: Chest imaging was reviewed by me and showed   CTA 11/30/22  Images are significantly degraded by respiratory and cardiac motion. No evidence of pulmonary embolism. Extensive amount of ground-glass densities throughout both lungs near   completely opacifying lung parenchyma bilaterally concerning for multifocal   pneumonia. Fibrotic changes throughout both lungs, predominantly affecting the lung   periphery. Findings were present on prior CT examination. Mild cardiomegaly. Echo 12/2/22:  EF 55%. Mild concentric LVH. Grade II diastolic dysfunction with elevated LV filling pressure. Mild-moderate MR. Mild TR. SPAP 46mmHg c/w mild pulm HTN. ASSESSMENT:  COVID-19 pneumonia  Acute on chronic life threatening hypoxemic respiratory failure; baseline 2 L  ILD, seems UIP-IPF, on Pirfenidone f/b pulm Dr. Claudio Lujan at the [place] - with acute exacerbation   Hemoptysis   Chronic diastolic CHF   Former smoker     PLAN:  COVID-19 isolation, droplet plus  Supplemental O2 to maintain SaO2 >92%; monitor sats closely   Scheduled Albuterol per pt request.  Needs nebulizer at discharge. On Solumedrol 40 q8   S/P Tocilizumab x1 12/1/22  Ceftriaxone D#5  Prophylaxis: Lovenox  F/U imaging will be with [facility]. Resume home Pirfenidone at discharge.   Has appt with Dr. Claudio Lujan next week    I spent a total of 21 minutes on this encounter personally obtaining the patient history, reviewing labs, imaging and other data. I independently performed the above physical exam and updated this encounter note, assessment and plan as needed. Loreto Santiago MD on 12/5/22 at 7:15 PM EST    I spent a total of 13 minutes on this encounter on chart review, history taking and review of data. I independently performed the above physical exam and updated this encounter note as needed.    Golden العلي PA-C on 12/5/22 at 10:31 AM EST

## 2022-12-05 NOTE — PROGRESS NOTES
Bedside report and transfer of care given to [address]. Pt currently resting in bed with the call light within reach. Pt denies any other care needs at this time. Pt stable at this time.

## 2022-12-06 VITALS
WEIGHT: 172.4 LBS | DIASTOLIC BLOOD PRESSURE: 70 MMHG | RESPIRATION RATE: 16 BRPM | HEIGHT: 69 IN | BODY MASS INDEX: 25.53 KG/M2 | TEMPERATURE: 96.1 F | OXYGEN SATURATION: 94 % | HEART RATE: 52 BPM | SYSTOLIC BLOOD PRESSURE: 124 MMHG

## 2022-12-06 LAB
GLUCOSE BLD-MCNC: 279 MG/DL (ref 70–99)
GLUCOSE BLD-MCNC: 446 MG/DL (ref 70–99)
PERFORMED ON: ABNORMAL
PERFORMED ON: ABNORMAL

## 2022-12-06 PROCEDURE — 99232 SBSQ HOSP IP/OBS MODERATE 35: CPT

## 2022-12-06 PROCEDURE — 94640 AIRWAY INHALATION TREATMENT: CPT

## 2022-12-06 PROCEDURE — 6360000002 HC RX W HCPCS: Performed by: INTERNAL MEDICINE

## 2022-12-06 PROCEDURE — 2700000000 HC OXYGEN THERAPY PER DAY

## 2022-12-06 PROCEDURE — 6370000000 HC RX 637 (ALT 250 FOR IP): Performed by: HOSPITALIST

## 2022-12-06 PROCEDURE — 94761 N-INVAS EAR/PLS OXIMETRY MLT: CPT

## 2022-12-06 PROCEDURE — 2580000003 HC RX 258: Performed by: HOSPITALIST

## 2022-12-06 RX ORDER — ALBUTEROL SULFATE 2.5 MG/3ML
2.5 SOLUTION RESPIRATORY (INHALATION)
Qty: 120 EACH | Refills: 0 | Status: SHIPPED | OUTPATIENT
Start: 2022-12-06

## 2022-12-06 RX ORDER — CEFDINIR 300 MG/1
300 CAPSULE ORAL 2 TIMES DAILY
Qty: 8 CAPSULE | Refills: 0 | Status: SHIPPED | OUTPATIENT
Start: 2022-12-06 | End: 2022-12-10

## 2022-12-06 RX ORDER — ALBUTEROL SULFATE 90 UG/1
2 AEROSOL, METERED RESPIRATORY (INHALATION) EVERY 6 HOURS PRN
Qty: 18 G | Refills: 0 | Status: SHIPPED | OUTPATIENT
Start: 2022-12-06

## 2022-12-06 RX ORDER — PREDNISONE 10 MG/1
TABLET ORAL
Qty: 18 TABLET | Refills: 0 | Status: SHIPPED | OUTPATIENT
Start: 2022-12-06 | End: 2022-12-06 | Stop reason: SDUPTHER

## 2022-12-06 RX ORDER — FLUTICASONE PROPIONATE 50 MCG
2 SPRAY, SUSPENSION (ML) NASAL DAILY
Qty: 16 G | Refills: 3 | OUTPATIENT
Start: 2022-12-06 | End: 2023-12-06

## 2022-12-06 RX ORDER — PREDNISONE 10 MG/1
TABLET ORAL
Qty: 18 TABLET | Refills: 0 | Status: SHIPPED | OUTPATIENT
Start: 2022-12-06

## 2022-12-06 RX ORDER — PREDNISONE 20 MG/1
40 TABLET ORAL DAILY
Status: DISCONTINUED | OUTPATIENT
Start: 2022-12-07 | End: 2022-12-06 | Stop reason: HOSPADM

## 2022-12-06 RX ORDER — PIRFENIDONE 801 MG/1
801 TABLET, FILM COATED ORAL
COMMUNITY

## 2022-12-06 RX ADMIN — SODIUM CHLORIDE, PRESERVATIVE FREE 10 ML: 5 INJECTION INTRAVENOUS at 08:41

## 2022-12-06 RX ADMIN — ATORVASTATIN CALCIUM 80 MG: 40 TABLET, FILM COATED ORAL at 08:41

## 2022-12-06 RX ADMIN — ATENOLOL 100 MG: 50 TABLET ORAL at 08:41

## 2022-12-06 RX ADMIN — INSULIN LISPRO 2 UNITS: 100 INJECTION, SOLUTION INTRAVENOUS; SUBCUTANEOUS at 08:42

## 2022-12-06 RX ADMIN — ISOSORBIDE MONONITRATE 60 MG: 60 TABLET ORAL at 08:41

## 2022-12-06 RX ADMIN — INSULIN LISPRO 4 UNITS: 100 INJECTION, SOLUTION INTRAVENOUS; SUBCUTANEOUS at 11:23

## 2022-12-06 RX ADMIN — ASPIRIN 81 MG: 81 TABLET, COATED ORAL at 08:41

## 2022-12-06 RX ADMIN — Medication 2 PUFF: at 07:51

## 2022-12-06 RX ADMIN — METHYLPREDNISOLONE SODIUM SUCCINATE 40 MG: 40 INJECTION, POWDER, FOR SOLUTION INTRAMUSCULAR; INTRAVENOUS at 08:41

## 2022-12-06 RX ADMIN — METHYLPREDNISOLONE SODIUM SUCCINATE 40 MG: 40 INJECTION, POWDER, FOR SOLUTION INTRAMUSCULAR; INTRAVENOUS at 01:20

## 2022-12-06 RX ADMIN — Medication 2 PUFF: at 11:54

## 2022-12-06 NOTE — DISCHARGE INSTRUCTIONS
[facility]. Follow Up with your PCP and lung doctor.

## 2022-12-06 NOTE — PROGRESS NOTES
Patient educated on discharge instructions as well as new medications use, dosage, administration and possible side effects. Patient verified knowledge. IV removed without difficulty and dry dressing in place. Telemetry monitor removed and returned to [facility]. Pt left facility in stable condition to Home with all of their personal belongings.

## 2022-12-06 NOTE — DISCHARGE SUMMARY
Name:  Kimberly Barnhart  Room:  /6938-92  MRN:    [id number]    Discharge Summary      This discharge summary is in conjunction with a complete physical exam done on the day of discharge. Discharging Physician: Dr. Vasquez Roque: 11/30/2022  Discharge:  12/06/2022    HPI taken from admission H&P:    67 y.o. male with history of pulmonary fibrosis chronically on 2 lpm NC O2, notes increased sob / delatorre x 2 days. He's had a non productive cough, loss of appetite due to change in taste x 3-4 days and an episode of nausea and vomiting today while in the shower. He denies worsening sob after vomiting, denies hematemesis, fever, chills, chest pain, but due to progressive hypoxemia he consults ER today where he is currently seen on 10lpm high flow in no resp distress, speaking in full sentences coherently     Diagnoses this Admission and Hospital Course   #Acute on chronic hypoxic respiratory failure   #Pulmonary fibrosis   #COVID pneumonia   -has pulmonologist reportedly at [place] but is not on any inhalers or treatment reportedly   -Pulmonology consulted  -on 2 L O2 baseline, requiring 10 high flow nasal cannula on admission-> O2 requirement up to 15L. Continued on supplemental oxygen, weaned as tolerated. O2 down to 4 L-> 3 L at rest.  Still desaturating with activity; requiring O2 up to 8 L with activity  Sats improved now . Sats stable on O2 2L at rest and with activity today   -  LA WNLs, procalc neg   -CT scan reviewed- as above ; shows groundglass opacities, possible pneumonia. Patient has underlying pulmonary fibrosis  - continued  IV solumedrol -> transition to Po prednisone   - s/p tocilizumab   -on IV rocephin per pulm for possible superimposed gram-positive bacterial pneumonia   Rocephin day 7   -blood cultures neg  -strep and legionella neg      #This patient had sepsis due to Covid which was present on admission.      #Elevated d dimer  -CTPA neg for PE      #Hyperkalemia   -resolved #Cardiomegaly   -no echo in EMR. echo done G2 DD, normal EF     #DM type 2   -holding oral regimen   -SSI  -continue to monitor BG      #CAD   -on ASA, statin, imdur, and BB      #HTN   -atenolol      #HLD   -on statin      # Hyponatremia  -Resolved. Discharge plan for home with home health care . Sats stable today   DC on  Po Omnicef, nebulizers /metered-dose inhalers and prednisone on discharge       Procedures (Please Review Full Report for Details)  N/A    Consults    Pulmonology       Physical Exam at Discharge:    /70   Pulse 52   Temp (!) 96.1 °F (35.6 °C) (Oral)   Resp 16   Ht 5' 9\" (1.753 m)   Wt 172 lb 6.4 oz (78.2 kg)   SpO2 94%   BMI 25.46 kg/m²      Gen: No distress. Alert. Pleasant male   Eyes: PERRL. No sclera icterus. No conjunctival injection. Neck: No JVD. Trachea midline. Resp: No accessory muscle use. No crackles. No wheezes. ++Diminished breath sounds with some rhonchi   CV: Regular rate. Regular rhythm. No murmur. No rub. No edema. Peripheral Pulses: +2 palpable, equal bilaterally   GI: Non-tender. Non-distended. Normal bowel sounds. Skin: Warm and dry. No nodule on exposed extremities. No rash on exposed extremities. M/S: No cyanosis. No joint deformity. No clubbing. Neuro: Awake. Grossly nonfocal    Psych: Oriented x 3. No anxiety or agitation.      Lab Results   Component Value Date    WBC 8.0 12/04/2022    HGB 14.5 12/04/2022    HCT 43.1 12/04/2022    MCV 94.0 12/04/2022     12/04/2022     Lab Results   Component Value Date     (L) 12/04/2022    K 4.9 12/04/2022     12/04/2022    CO2 24 12/04/2022    BUN 39 (H) 12/04/2022    CREATININE 0.9 12/04/2022    GLUCOSE 277 (H) 12/04/2022    CALCIUM 9.5 12/04/2022    PROT 8.7 (H) 11/30/2022    LABALBU 3.9 11/30/2022    BILITOT 0.7 11/30/2022    ALKPHOS 116 11/30/2022    AST 40 (H) 11/30/2022    ALT 11 11/30/2022    LABGLOM >60 12/04/2022    GFRAA >60 05/22/2020    AGRATIO 0.8 (L) 11/30/2022 GLOB 4.1 05/22/2020       CULTURES  COVID detected  Influenza A and B not detected  Blood cultures negative       RADIOLOGY  CT CHEST PULMONARY EMBOLISM W CONTRAST   Final Result   Images are significantly degraded by respiratory and cardiac motion. No evidence of pulmonary embolism. Extensive amount of ground-glass densities throughout both lungs near   completely opacifying lung parenchyma bilaterally concerning for multifocal   pneumonia. Fibrotic changes throughout both lungs, predominantly affecting the lung   periphery. Findings were present on prior CT examination. Mild cardiomegaly. RECOMMENDATIONS:   Unavailable         XR CHEST PORTABLE   Final Result   1. Findings likely reflect acute pneumonia bilateral mid and lower lungs on a   background of chronic pulmonary disease. 2. Calcific atherosclerosis aorta. 3. Cardiomegaly. ECHO   Conclusions      Summary   LV systolic function is normal with ejection fraction estimated 55%. No obvious segmental wall motion abnormalities. There is mild concentric left ventricular hypertrophy. Grade II diastolic dysfunction with elevated left ventricular filling   pressure. Mild mitral annular calcification. Aortic valve appears sclerotic but opens adequately. Mild-moderate mitral regurgitation. Mild tricuspid regurgitation. Systolic pulmonary artery pressure (SPAP) is estimated at 46 mmHg c/w mild   pulm HTN (Right atrial pressure of 8 mmHg). Pt COVID positive.           Discharge Medications     Medication List        START taking these medications      * albuterol sulfate  (90 Base) MCG/ACT inhaler  Commonly known as: PROVENTIL;VENTOLIN;PROAIR  Inhale 2 puffs into the lungs every 6 hours as needed for Wheezing or Shortness of Breath  Notes to patient: Recent doses of this drug-Today at 1154     * albuterol (2.5 MG/3ML) 0.083% nebulizer solution  Commonly known as: PROVENTIL  Take 3 mLs by nebulization every 4 hours (while awake)     cefdinir 300 MG capsule  Commonly known as: OMNICEF  Take 1 capsule by mouth 2 times daily for 4 days     predniSONE 10 MG tablet  Commonly known as: DELTASONE  Take 3 tabs a day for 3 days,Then 2 tabs a day for 3 days ,Then 1 tab a day for 3 days. * This list has 2 medication(s) that are the same as other medications prescribed for you. Read the directions carefully, and ask your doctor or other care provider to review them with you. CONTINUE taking these medications      aspirin 81 MG EC tablet     atenolol 100 MG tablet  Commonly known as: TENORMIN     atorvastatin 80 MG tablet  Commonly known as: LIPITOR     glyBURIDE 5 MG tablet  Commonly known as: DIABETA     isosorbide mononitrate 60 MG extended release tablet  Commonly known as: IMDUR     metFORMIN 1000 MG tablet  Commonly known as: GLUCOPHAGE     Pirfenidone 801 MG Tabs               Where to Get Your Medications        These medications were sent to [id number] [address], [address] [place] [id number]      Phone: [phone]   albuterol (2.5 MG/3ML) 0.083% nebulizer solution  albuterol sulfate  (90 Base) MCG/ACT inhaler  cefdinir 300 MG capsule  predniSONE 10 MG tablet           Discharged in stable condition to home    D/C home with [facility]. Total time 35 minutes. > 50%  dominated by counseling and coordination of care. Follow Up:   Follow up with PCP in 1 week    Ej Anthony MD

## 2022-12-06 NOTE — CARE COORDINATION
INTERDISCIPLINARY PLAN OF CARE CONFERENCE and Discharge Summary    Date/Time: 12/6/2022 10:10 AM  Completed by: NILESH Sahu   Case Management      Patient Name:  Judson Taylor  YOB: 1950  Admitting Diagnosis: History of pulmonary fibrosis [Z87.09]  Acute on chronic respiratory failure with hypoxia ([address] [J96.21]  Pneumonia due to COVID-19 virus [U07.1, J12.82]     Admit Date/Time:  11/30/2022  4:08 PM    Chart reviewed. Interdisciplinary team contacted or reviewed plan related to patient progress and discharge plans. Disciplines included Case Management, Nursing, and Dietitian. Current Status:ongoing   PT/OT recommendation for discharge plan of care: n/a    Expected D/C Disposition:  Home  Confirmed plan with patient and/or family Yes confirmed with: (name) pt  Met with:pt    Discharge Plan Comments: Chart review completed. Called and spoke with pt via bedside phone due to covid isolation. Pt confirmed he will return home when discharged. Discussed skilled home care for RN/PT/OT and pt declined stating he won't need this. Pt stated he doesn't have a nebulizer at home and wants to use the [place] for this which he uses the [facility]. He is aware CM will call them to update on the need for a nebulizer. Called the [address] [place] [address] at [phone] and left a message requesting call back to update on the need for a nebulizer    Home O2 in place on admit: Yes    Addendum at 11:28am: RN states she walked pt and pt remained on his 2 liters which is baseline (see RN note). Called and spoke with Jennifer Jensen at [facility] ([phone]) to inquire about what pt needs to get a 10 liter concentrator as MD inquired this in rounds. Jennifer Jensen states pt would need to be at 6 liters or higher to get a 10 liter concentrator which pt currently doesn't qualify for this. Jennifer Jensen states pt's orders are for 2-3 liters.  Jennifer Jensen stated that the order would need to come directly from the [place] if he would qualify for a 10 liter concentrator. Spoke with Mary at [address] who states pt sees the clinic and she transferred CM to Tenisha Hess who transferred CM to [place], [facility]; message left requesting call back about a nebulizer. Mika HORTON aware of the above and RN will chart how far pt walked. Addendum at 12:56pm: Received call from Aleisha at the [facility] ([phone]) who states that she has spoken with the Respiratory therapist at the [place] and they will overnight a Nebulizer to pt's house for delivery tomorrow pending no delay with the mail and it is just the machine; no medication. Aleisha states pt's best option would be able to get medications filled at the hospital and ask for the [place] to reimburse him. Aleisha aware pt is discharged today. Met with pt and his significant other at bedside with pt's permission. He confirmed he will return home today and his significant other will transport him home today. She stated he has an o2 tank for him. Discussed skilled home care and he declined stating he doesn't need this. He is aware that the [place] will send a nebulizer to his house and he can ask the [place] to reimburse him. He stated agreement and denied all needs for CM. Also received call from Fred at the [place] and [employer] who now state they need an order for the nebulizer which was faxed to them at [phone] and [place] unable to state if pt will have nebulizer shipped overnight. Faxed the AVS to [facility] as well and she is aware pt denied HHC. Pt is being d/c'd to home today. Pt's O2 sats are 92% on 2 liters. Discharge timeout done with [facility]. All discharge needs and concerns addressed. Addendum at 1:52pm: [facility], spoke with pt and his significant other at bedside. Pt requesting to get all medications filled through the [place] with the understanding that they may not arrive tomorrow as he doesn't want to pay co-pays through meds to beds.  Pt stated that the [place] called them and the nebulizer will be shipped to home tomorrow. Spoke with Jocelyn at the [facility] who states all medications will be shipped to pt's house by tomorrow and the nebulizer. She confirmed they received the AVS with the medications. RN updated and afshin in meds to beds aware.

## 2022-12-06 NOTE — PROGRESS NOTES
RT Inhaler-Nebulizer Bronchodilator Protocol Note    There is a bronchodilator order in the chart from a provider indicating to follow the RT Bronchodilator Protocol and there is an Initiate RT Inhaler-Nebulizer Bronchodilator Protocol order as well (see protocol at bottom of note). CXR Findings:  No results found. The findings from the last RT Protocol Assessment were as follows:   History Pulmonary Disease: Chronic pulmonary disease  Respiratory Pattern: Regular pattern and RR 12-20 bpm  Breath Sounds: Slightly diminished and/or crackles  Cough: Strong, spontaneous, non-productive  Indication for Bronchodilator Therapy: Decreased or absent breath sounds  Bronchodilator Assessment Score: 4    Aerosolized bronchodilator medication orders have been revised according to the RT Inhaler-Nebulizer Bronchodilator Protocol below. Respiratory Therapist to perform RT Therapy Protocol Assessment initially then follow the protocol. Repeat RT Therapy Protocol Assessment PRN for score 0-3 or on second treatment, BID, and PRN for scores above 3. No Indications - adjust the frequency to every 6 hours PRN wheezing or bronchospasm, if no treatments needed after 48 hours then discontinue using Per Protocol order mode. If indication present, adjust the RT bronchodilator orders based on the Bronchodilator Assessment Score as indicated below. Use Inhaler orders unless patient has one or more of the following: on home nebulizer, not able to hold breath for 10 seconds, is not alert and oriented, cannot activate and use MDI correctly, or respiratory rate 25 breaths per minute or more, then use the equivalent nebulizer order(s) with same Frequency and PRN reasons based on the score. If a patient is on this medication at home then do not decrease Frequency below that used at home.     0-3 - enter or revise RT bronchodilator order(s) to equivalent RT Bronchodilator order with Frequency of every 4 hours PRN for wheezing or increased work of breathing using Per Protocol order mode. 4-6 - enter or revise RT Bronchodilator order(s) to two equivalent RT bronchodilator orders with one order with BID Frequency and one order with Frequency of every 4 hours PRN wheezing or increased work of breathing using Per Protocol order mode. 7-10 - enter or revise RT Bronchodilator order(s) to two equivalent RT bronchodilator orders with one order with TID Frequency and one order with Frequency of every 4 hours PRN wheezing or increased work of breathing using Per Protocol order mode. 11-13 - enter or revise RT Bronchodilator order(s) to one equivalent RT bronchodilator order with QID Frequency and an Albuterol order with Frequency of every 4 hours PRN wheezing or increased work of breathing using Per Protocol order mode. Greater than 13 - enter or revise RT Bronchodilator order(s) to one equivalent RT bronchodilator order with every 4 hours Frequency and an Albuterol order with Frequency of every 2 hours PRN wheezing or increased work of breathing using Per Protocol order mode.      \    Electronically signed by Dilshad Segundo RCP on 12/5/2022 at 7:12 PM

## 2022-12-06 NOTE — PROGRESS NOTES
Vitals:    12/05/22 1945   BP: 123/69   Pulse: 68   Resp: 16   Temp: (!) 96.5 °F (35.8 °C)   SpO2: 92%     Pt resting in bed this evening watching TV. Shift assessment complete and all meds given per MAR. , 4 units given. Pt denies pain. Snack provided with beverage. Bed in lowest position, call light in hand. All patient needs addressed. Will continue to monitor.

## 2022-12-06 NOTE — PROGRESS NOTES
PULMONARY PROGRESS NOTE  CC: Shortness of breath, hemoptysis, ILD & COVID    Subjective:   Stable on 2-3 L even with exertion today for several minutes. Reports that at baseline he desaturates to high 70s- low 80s% at home on 2 L. IV line: Peripheral      PHYSICAL EXAM:   /70   Pulse 52   Temp (!) 96.1 °F (35.6 °C) (Oral)   Resp 16   Ht 5' 9\" (1.753 m)   Wt 172 lb 6.4 oz (78.2 kg)   SpO2 96%   BMI 25.46 kg/m² ' on 3 L  General: NAD   Eyes: PERRL. No sclera icterus. No conjunctival injection. ENT: No discharge. Pharynx clear. Neck: Trachea midline. Normal thyroid. Resp: No accessory muscle use. + bilateral crackles. No wheezing. No rhonchi. No dullness on percussion. CV: Regular rate. Regular rhythm. No mumur or rub. No edema. Peripheral pulses are 2+. Capillary refill is less than 3 seconds. GI: Non-tender. Non-distended. No masses. No organomegaly. Normal bowel sounds. No hernia. Skin: Warm and dry. No nodule on exposed extremities. No rash on exposed extremities. Lymph: No cervical LAD. No supraclavicular LAD. M/S: No cyanosis. No joint deformity. No clubbing. Neuro: A&OX3. Patellar reflexes are symmetric. Psych: No agitation, no anxiety, affect is full.      Scheduled Meds:   albuterol  2.5 mg Nebulization Q4H WA    methylPREDNISolone  40 mg IntraVENous Q8H    cefTRIAXone (ROCEPHIN) IV  1,000 mg IntraVENous Q24H    aspirin  81 mg Oral Daily    atenolol  100 mg Oral Daily    isosorbide mononitrate  60 mg Oral Daily    atorvastatin  80 mg Oral Daily    sodium chloride flush  5-40 mL IntraVENous 2 times per day    enoxaparin  30 mg SubCUTAneous BID    insulin lispro  0-4 Units SubCUTAneous TID WC    insulin lispro  0-4 Units SubCUTAneous Nightly     Continuous Infusions:   sodium chloride      dextrose       PRN Meds:  albuterol sulfate HFA, albuterol sulfate HFA, HYDROcodone 5 mg - acetaminophen **OR** HYDROcodone 5 mg - acetaminophen, perflutren lipid microspheres, sodium chloride flush, sodium chloride, ondansetron **OR** ondansetron, polyethylene glycol, acetaminophen **OR** acetaminophen, glucose, dextrose bolus **OR** dextrose bolus, glucagon (rDNA), dextrose    Labs:  CBC:   Recent Labs     12/04/22  0425   WBC 8.0   HGB 14.5   HCT 43.1   MCV 94.0        BMP:   Recent Labs     12/04/22  0425   *   K 4.9      CO2 24   BUN 39*   CREATININE 0.9     Micro:  11/30/2022 SARS-CoV-2 DETECTED  12/1/22 Urine legionella & strep pneumo Ags negative  Procalcitonin 0.08      Imaging: Chest imaging was reviewed by me and showed   CTA 11/30/22  Images are significantly degraded by respiratory and cardiac motion. No evidence of pulmonary embolism. Extensive amount of ground-glass densities throughout both lungs near   completely opacifying lung parenchyma bilaterally concerning for multifocal   pneumonia. Fibrotic changes throughout both lungs, predominantly affecting the lung   periphery. Findings were present on prior CT examination. Mild cardiomegaly. Echo 12/2/22:  EF 55%. Mild concentric LVH. Grade II diastolic dysfunction with elevated LV filling pressure. Mild-moderate MR. Mild TR. SPAP 46mmHg c/w mild pulm HTN. ASSESSMENT:  COVID-19 pneumonia  Acute on chronic life threatening hypoxemic respiratory failure; baseline 2 L at rest & 3-4 L with exertion  ILD, seems UIP-IPF, on Pirfenidone f/b pulm Dr. Fabricio Angel at the [place] - with acute exacerbation   Hemoptysis   Chronic diastolic CHF   Former smoker     PLAN:  COVID-19 isolation, droplet plus  Supplemental O2 to maintain SaO2 >92%; monitor sats closely   Scheduled nebulizers per pt request.  Needs nebulizer at d/c. Solumedrol 40 q8   S/P Tocilizumab x1 12/1/22  Ceftriaxone D#6  Prophylaxis: Lovenox  F/U [facility] will be with [facility]. Resume home Pirfenidone at discharge.   Has appt with Dr. Fabricio Angel next week  D/C planning OK from a pulmonary perspective on steroid taper      I spent a total of 12 minutes on this encounter on chart review, history taking and review of data. I independently performed a physical exam and updated this encounter note as needed.    CLINTON Jain on 12/6/22 at 9:01 AM EST

## 2022-12-06 NOTE — PLAN OF CARE
Care Plan, Education, Fall Risk, Kenneth Scale, and Lift Assessment complete. Patient is resting and reports no needs at this time.      Problem: Discharge Planning  Goal: Discharge to home or other facility with appropriate resources  Outcome: Progressing     Problem: ABCDS Injury Assessment  Goal: Absence of physical injury  Outcome: Progressing     Problem: Chronic Conditions and Co-morbidities  Goal: Patient's chronic conditions and co-morbidity symptoms are monitored and maintained or improved  Outcome: Progressing     Problem: Safety - Adult  Goal: Free from fall injury  Outcome: Progressing

## 2022-12-06 NOTE — PROGRESS NOTES
Shift assessment complete. See flow sheet. Scheduled meds given. See MAR. Patient in 2L nasal cannula. This is baseline per patient. Patients head-toe complete, VS are logged, and active bowel sound noted in all four quadrants. No further needs noted at this time. Call light and bedside table are within reach. The bed is locked and is in the lowest position.

## 2022-12-06 NOTE — PROGRESS NOTES
Handoff report given to [facility], [address]. Pt stable. Care transferred.